# Patient Record
Sex: FEMALE | Race: WHITE | NOT HISPANIC OR LATINO | Employment: OTHER | URBAN - METROPOLITAN AREA
[De-identification: names, ages, dates, MRNs, and addresses within clinical notes are randomized per-mention and may not be internally consistent; named-entity substitution may affect disease eponyms.]

---

## 2017-01-04 ENCOUNTER — HOSPITAL ENCOUNTER (OUTPATIENT)
Dept: PHYSICAL THERAPY | Facility: REHABILITATION | Age: 72
End: 2017-01-04
Attending: NURSE PRACTITIONER
Payer: MEDICARE

## 2017-01-04 PROCEDURE — G8980 MOBILITY D/C STATUS: HCPCS | Mod: CJ

## 2017-01-04 PROCEDURE — 97110 THERAPEUTIC EXERCISES: CPT

## 2017-01-04 PROCEDURE — G8979 MOBILITY GOAL STATUS: HCPCS | Mod: CJ

## 2017-01-04 PROCEDURE — 97014 ELECTRIC STIMULATION THERAPY: CPT

## 2017-01-06 ENCOUNTER — OFFICE VISIT (OUTPATIENT)
Dept: MEDICAL GROUP | Facility: MEDICAL CENTER | Age: 72
End: 2017-01-06
Attending: FAMILY MEDICINE
Payer: MEDICARE

## 2017-01-06 VITALS
TEMPERATURE: 96.9 F | WEIGHT: 164 LBS | BODY MASS INDEX: 30.96 KG/M2 | HEART RATE: 72 BPM | SYSTOLIC BLOOD PRESSURE: 116 MMHG | HEIGHT: 61 IN | OXYGEN SATURATION: 97 % | DIASTOLIC BLOOD PRESSURE: 78 MMHG

## 2017-01-06 DIAGNOSIS — Z96.619 PROSTHETIC SHOULDER INFECTION, SUBSEQUENT ENCOUNTER: ICD-10-CM

## 2017-01-06 DIAGNOSIS — E78.5 DYSLIPIDEMIA: ICD-10-CM

## 2017-01-06 DIAGNOSIS — Z12.31 ENCOUNTER FOR SCREENING MAMMOGRAM FOR MALIGNANT NEOPLASM OF BREAST: ICD-10-CM

## 2017-01-06 DIAGNOSIS — T84.59XD PROSTHETIC SHOULDER INFECTION, SUBSEQUENT ENCOUNTER: ICD-10-CM

## 2017-01-06 DIAGNOSIS — K21.9 GASTROESOPHAGEAL REFLUX DISEASE WITHOUT ESOPHAGITIS: ICD-10-CM

## 2017-01-06 DIAGNOSIS — E03.8 OTHER SPECIFIED HYPOTHYROIDISM: ICD-10-CM

## 2017-01-06 DIAGNOSIS — M85.80 OSTEOPENIA: ICD-10-CM

## 2017-01-06 DIAGNOSIS — F33.41 MAJOR DEPRESSIVE DISORDER, RECURRENT EPISODE, IN PARTIAL REMISSION (HCC): ICD-10-CM

## 2017-01-06 DIAGNOSIS — I48.0 PAROXYSMAL ATRIAL FIBRILLATION (HCC): ICD-10-CM

## 2017-01-06 DIAGNOSIS — I10 ESSENTIAL HYPERTENSION: ICD-10-CM

## 2017-01-06 PROCEDURE — G0438 PPPS, INITIAL VISIT: HCPCS | Performed by: FAMILY MEDICINE

## 2017-01-06 PROCEDURE — G0439 PPPS, SUBSEQ VISIT: HCPCS | Performed by: FAMILY MEDICINE

## 2017-01-06 ASSESSMENT — PATIENT HEALTH QUESTIONNAIRE - PHQ9: CLINICAL INTERPRETATION OF PHQ2 SCORE: 0

## 2017-01-06 NOTE — MR AVS SNAPSHOT
"        Ora Martinez   2017 3:10 PM   Office Visit   MRN: [id number]    Department[facility]   Dept Phone:  [phone]    Description:  Female : 1945   Provider:  Jenaro Treviño M.D.           Reason for Visit     Annual Exam HRA      Allergies as of 2017     Allergen Noted Reactions    Urea 2014       IN CREAMS AND HAIR PRODUCTS    Tape 2015   Rash    Redness        You were diagnosed with     Chronic right shoulder pain   [698486]       Essential hypertension   [7123147]       Gastroesophageal reflux disease without esophagitis   [880947]       Other specified hypothyroidism   [8707648]       Major depressive disorder, recurrent episode, in partial remission ([facility])   [287173]         Vital Signs     Blood Pressure Pulse Temperature Height Weight Body Mass Index    116/78 mmHg 72 36.1 °C (96.9 °F) 1.56 m (5' 1.42\") 74.39 kg (164 lb) 30.57 kg/m2    Oxygen Saturation Breastfeeding? Smoking Status             97% No Former Smoker         Basic Information     Date Of Birth Sex Race Ethnicity Preferred Language    1945 Female White Non- English      Your appointments     2017  1:40 PM   MA SCRN10 with RB MG 3   [facility]    [address]  [place] [id number] [phone]           No deodorant, powder, perfume or lotion under the arm or breast area.            2017  3:10 PM   Established Patient with Jenaro Treviño M.D.   [facility] ([facility]    [address] [id number] [phone]           You will be receiving a confirmation call a few days before your appointment from our automated call confirmation system.              Problem List              ICD-10-CM Priority Class Noted - Resolved    Chronic shoulder pain (Chronic) M25.519, G89.29   2009 - Present    Right hip pain (Chronic) M25.551   2009 - Present    Multinodular goiter E04.2   " 8/13/2009 - Present    HTN (hypertension) I10   Unknown - Present    Anxiety F41.9   6/10/2010 - Present    Insomnia G47.00   6/10/2010 - Present    Dyslipidemia E78.5   6/10/2010 - Present    GERD (gastroesophageal reflux disease) K21.9   6/10/2010 - Present    Hypoxemia requiring supplemental oxygen R09.02, Z99.81   7/22/2010 - Present    Hypothyroid E03.9   4/15/2011 - Present    Helicobacter pylori ab+ treated w/ prevpack in june 2011 R76.8   9/22/2011 - Present    Osteopenia M85.80   4/27/2012 - Present    Marijuana use F12.10   4/24/2013 - Present    Bilateral hand pain M79.641, M79.642   1/29/2015 - Present    Lumbar pain M54.5   3/6/2015 - Present    Chronic allergic rhinitis J30.9   6/2/2015 - Present    Diarrhea R19.7   8/29/2015 - Present    Hypokalemia E87.6   8/29/2015 - Present    Paroxysmal atrial fibrillation (HCC) I48.0   9/11/2015 - Present    Prosthetic shoulder infection (HCC) T84.59XA, Z96.619   9/11/2015 - Present    Macrocytic anemia D53.9   9/11/2015 - Present    Cough R05   10/16/2015 - Present    Abdominal pain, bilateral lower quadrant    4/19/2016 - Present    Acute URI J06.9   4/19/2016 - Present    Abdominal pain, epigastric R10.13   9/22/2016 - Present    Major depressive disorder, recurrent episode, in partial remission (HCC) F33.41   12/9/2016 - Present    Shortness of breath R06.02   12/9/2016 - Present      Health Maintenance        Date Due Completion Dates    IMM DTaP/Tdap/Td Vaccine (1 - Tdap) 11/5/1966 ---    PAP SMEAR 11/5/1968 ---    IMM ZOSTER VACCINE 11/5/2007 ---    MAMMOGRAM 9/30/2015 9/30/2014, 3/8/2013, 10/4/2011, 4/2/2010, 4/2/2010, 9/9/2008, 9/9/2008, 5/25/2007, 5/25/2007, 2/10/2006, 3/19/2004    RETINAL SCREENING 6/2/2016 6/2/2015 (N/S)    Override on 6/2/2015: (N/S) (discussed)    IMM PNEUMOCOCCAL 65+ (ADULT) LOW/MEDIUM RISK SERIES (2 of 2 - PPSV23) 9/1/2016 9/1/2015    A1C SCREENING 6/15/2017 12/15/2016, 8/12/2016, 2/6/2016, 2/26/2015, 7/23/2014, 1/6/2014,  6/22/2011, 11/4/2009, 4/27/2005    DIABETES MONOFILAMTENT / LE EXAM 11/11/2017 11/11/2016    FASTING LIPID PROFILE 12/15/2017 12/15/2016, 2/6/2016, 7/23/2014, 4/18/2013, 4/30/2012, 9/16/2011, 10/4/2010, 11/4/2009, 1/19/2009, 7/16/2008, 5/14/2007, 4/28/2006, 1/12/2006    URINE ACR / MICROALBUMIN 12/15/2017 12/15/2016, 8/12/2016, 9/10/2014    SERUM CREATININE 12/15/2017 12/15/2016, 5/14/2016, 5/13/2016, 5/12/2016, 5/11/2016, 12/14/2015, 11/19/2015, 9/3/2015, 9/2/2015, 8/31/2015, 8/30/2015, 8/29/2015, 7/23/2014, 1/7/2014, 11/12/2013, 11/12/2013, 4/18/2013, 4/8/2013, 4/30/2012, 11/17/2011, 9/16/2011, 6/25/2011, 6/24/2011, 6/23/2011, 6/23/2011, 6/22/2011, 6/22/2011, 6/21/2011, 6/20/2011, 12/17/2010, 10/4/2010, 5/15/2010, 4/14/2010, 11/4/2009, 1/19/2009, 7/16/2008, 5/14/2007, 3/5/2007, 9/26/2006, 4/28/2006, 1/12/2006, 9/14/2005, 4/29/2005, 4/28/2005, 4/27/2005, 4/26/2005    BONE DENSITY 9/30/2019 9/30/2014, 4/26/2012, 7/16/2008, 1/11/2007    COLONOSCOPY 2/6/2023 2/6/2013 (Prv Comp), 2/6/2013 (Prv Comp)    Override on 2/6/2013: Previously completed (6 months ago. [facility]. )    Override on 2/6/2013: Previously completed (correction done 2/2012. due 2/2017)            Current Immunizations     13-VALENT PCV PREVNAR 9/1/2015  5:42 AM    INFLUENZA VACCINE H1N1 12/18/2009    Influenza TIV (IM) 12/10/2013, 10/18/2012, 10/20/2011, 11/10/2010    Influenza Vaccine Pediatric 12/18/2009    Influenza Vaccine Quad Inj (Pf) 11/11/2016, 10/12/2015, 10/16/2014    Pneumococcal Vaccine (UF)Historical Data 11/1/2010      Below and/or attached are the medications your provider expects you to take. Review all of your home medications and newly ordered medications with your provider and/or pharmacist. Follow medication instructions as directed by your provider and/or pharmacist. Please keep your medication list with you and share with your provider. Update the information when medications are discontinued, doses are changed, or new  medications (including over-the-counter products) are added; and carry medication information at all times in the event of emergency situations     Allergies:  UREA - (reactions not documented)     TAPE - Rash               Medications  Valid as of: January 06, 2017 -  3:55 PM    Generic Name Brand Name Tablet Size Instructions for use    Dicyclomine HCl (Cap) BENTYL 10 MG TAKE 1 CAP BY MOUTH 2 TIMES A DAY AS NEEDED.        DiphenhydrAMINE HCl (Tab) BENADRYL 25 MG Take 25 mg by mouth at bedtime as needed for Sleep.        DULoxetine HCl (Cap DR Particles) CYMBALTA 60 MG Take 120 mg by mouth every day.        Eszopiclone (Tab) Eszopiclone 3 MG Take 3 mg by mouth at bedtime as needed.        Folic Acid (Tab) FOLVITE 1 MG Take 1 Tab by mouth every day.        Gabapentin (Cap) NEURONTIN 300 MG TAKE 1 CAPSULE BY MOUTH TWICE DAILY        Ipratropium Bromide (Solution) ATROVENT 0.06 % Spray 2 Sprays in nose 2 times a day as needed.        Levothyroxine Sodium (Tab) SYNTHROID 88 MCG Take 88 mcg by mouth Every morning on an empty stomach.        Levothyroxine Sodium (Tab) SYNTHROID 88 MCG TAKE 1 TABLET BY MOUTH EVERY DAY        Levothyroxine Sodium (Tab) SYNTHROID 75 MCG Take 1 Tab by mouth Every morning on an empty stomach.        Linaclotide (Cap) Linaclotide 145 MCG Take  by mouth.        Loperamide HCl (Cap) IMODIUM 2 MG TAKE 1 CAP BY MOUTH 4 TIMES A DAY AS NEEDED. DIARRHEA        LORazepam (Tab) ATIVAN 1 MG Take 1 mg by mouth 1 time daily as needed.        Metoprolol Tartrate (Tab) LOPRESSOR 25 MG TAKE 1 TABLET BY MOUTH TWICE A DAY        Mometasone Furo-Formoterol Fum (Aerosol) DULERA 100-5 MCG/ACT INHALE 2 PUFFS BY MOUTH 2 TIMES A DAY.        Ramelteon (Tab) ROZEREM 8 MG Take 8 mg by mouth every evening.        RaNITidine HCl (Tab) ZANTAC 150 MG Take 150 mg by mouth 2 times a day.        Rivaroxaban (Tab) XARELTO 20 MG TAKE 1 TABLET BY MOUTH WITH DINNER        Salmeterol Xinafoate (AEROSOL POWDER, BREATH ACTIVATED)  SEREVENT 50 MCG/DOSE Inhale 1 Puff by mouth 2 times a day.        Simvastatin (Tab) ZOCOR 20 MG Take 20 mg by mouth every evening.        Simvastatin (Tab) ZOCOR 20 MG TAKE 1 TABLET BY MOUTH EVERY EVENING        Tiotropium Bromide Monohydrate (Cap) SPIRIVA 18 MCG Inhale 1 Cap by mouth every day.        .                 Medicines prescribed today were sent to:     [facility] #[id number] - [place], [place] - [address]    [address] [place] [address]    Phone: [phone] Fax: [phone]    Open 24 Hours?: No      Medication refill instructions:       If your prescription bottle indicates you have medication refills left, it is not necessary to call your provider’s office. Please contact your pharmacy and they will refill your medication.    If your prescription bottle indicates you do not have any refills left, you may request refills at any time through one of the following ways: The online [employer] system (except Urgent Care), by calling your provider’s office, or by asking your pharmacy to contact your provider’s office with a refill request. Medication refills are processed only during regular business hours and may not be available until the next business day. Your provider may request additional information or to have a follow-up visit with you prior to refilling your medication.   *Please Note: Medication refills are assigned a new Rx number when refilled electronically. Your pharmacy may indicate that no refills were authorized even though a new prescription for the same medication is available at the pharmacy. Please request the medicine by name with the pharmacy before contacting your provider for a refill.        Other Notes About Your Plan     Clarify clonidine at FU appt- ?HTN vs hot flashes  UDS +marijuana. Pt refusing pain management/cessation of marijuana, wean off morphine  On metoclopramide--patient aware of risk of tardive dyskinesia   Screening mammogram due 3/2014           [employer]  Status: Patient Declined

## 2017-01-06 NOTE — PROGRESS NOTES
Depression Screening    Little interest or pleasure in doing things?  0 - not at all  Feeling down, depressed , or hopeless? 0 - not at all  Patient Health Questionnaire Score: 0     If depressive symptoms identified deferred to follow up visit unless specifically addressed in assesment and plan.    Screening for Cognitive Impairment    Three Minute Recall (banana, sunrise, fence)  2/3    Draw clock face with all 12 numbers set to the hand to show 10 minures past 11 o'clock  1    Cognitive concerns identified defferred for follow up unless specifically addressed in assesment and plan.    Fall Risk Assessment    Has the patient had two or more falls in the last year or any fall with injury in the last year?  No    Safety Assessment    Throw rugs on floor.  No  Handrails on all stairs.  Yes  Good lighting in all hallways.  Yes  Difficulty hearing.  No  Patient counseled about all safety risks that were identified.    Functional Assessment ADLs    Are there any barriers preventing you from cooking for yourself or meeting nutritional needs?  No.    Are there any barriers preventing you from driving safely or obtaining transportation?  No.    Are there any barriers preventing you from using a telephone or calling for help?  No.    Are there any barriers preventing you from shopping?  No.    Are there any barriers preventing you from taking care of your own finances?  No.    Are there any barriers preventing you from managing your medications?  No.    Are currently engaing any exercise or physical activity?  Yes.       Health Maintenance Summary                Annual Wellness Visit Overdue 1945     IMM DTaP/Tdap/Td Vaccine Overdue 11/5/1966     PAP SMEAR Overdue 11/5/1968     IMM ZOSTER VACCINE Overdue 11/5/2007     MAMMOGRAM Overdue 9/30/2015      Done 9/30/2014 MA-SCREENING MAMMOGRAM W/ CAD     Patient has more history with this topic...    RETINAL SCREENING Overdue 6/2/2016      Not specified 6/2/2015 discussed     IMM PNEUMOCOCCAL 65+ (ADULT) LOW/MEDIUM RISK SERIES Overdue 9/1/2016      Done 9/1/2015 Imm Admin: Pneumococcal Conjugate Vaccine (Prevnar/PCV-13)    A1C SCREENING Next Due 6/15/2017      Done 12/15/2016 HEMOGLOBIN A1C (A)     Patient has more history with this topic...    DIABETES MONOFILAMTENT / LE EXAM Next Due 11/11/2017      Done 11/11/2016 AMB DIABETIC MONOFILAMENT LOWER EXTREMITY EXAM    FASTING LIPID PROFILE Next Due 12/15/2017      Done 12/15/2016 LIPID PROFILE     Patient has more history with this topic...    URINE ACR / MICROALBUMIN Next Due 12/15/2017      Done 12/15/2016 MICROALBUMIN CREAT RATIO URINE     Patient has more history with this topic...    SERUM CREATININE Next Due 12/15/2017      Done 12/15/2016 COMP METABOLIC PANEL (A)     Patient has more history with this topic...    BONE DENSITY Next Due 9/30/2019      Done 9/30/2014 DS-BONE DENSITY STUDY (DEXA)     Patient has more history with this topic...    COLONOSCOPY Next Due 2/6/2023      Previously completed 2/6/2013 6 months ago. [facility].      Patient has more history with this topic...          Patient Care Team:  Jenaro Treviño M.D. as PCP - General (Family Medicine)

## 2017-01-06 NOTE — PROGRESS NOTES
Chief Complaint   Patient presents with   • Annual Exam     HRA         HPI:  Ora is a 71 y.o. 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/11/2015   • Macrocytic anemia 09/11/2015   • Diarrhea 08/29/2015   • Hypokalemia 08/29/2015   • Chronic allergic rhinitis 06/02/2015   • Lumbar pain 03/06/2015   • Type 2 diabetes mellitus (Formerly [facility]) 02/19/2015   • Bilateral hand pain 01/29/2015   • Marijuana use 04/24/2013   • Osteopenia 04/27/2012   • Helicobacter pylori ab+ treated w/ prevpack in june 2011 09/22/2011   • Hypothyroid 04/15/2011   • Hypoxemia requiring supplemental oxygen 07/22/2010   • Anxiety 06/10/2010   • Insomnia 06/10/2010   • Dyslipidemia 06/10/2010   • GERD (gastroesophageal reflux disease) 06/10/2010   • HTN (hypertension)    • Chronic shoulder pain 08/13/2009   • Right hip pain 08/13/2009   • Multinodular goiter 08/13/2009       Current Outpatient Prescriptions   Medication Sig Dispense Refill   • DULERA 100-5 MCG/ACT Aerosol INHALE 2 PUFFS BY MOUTH 2 TIMES A DAY. 13 Inhaler 3   • metoprolol (LOPRESSOR) 25 MG Tab TAKE 1 TABLET BY MOUTH TWICE A DAY 60 Tab 6   • levothyroxine (SYNTHROID) 75 MCG Tab Take 1 Tab by mouth Every morning on an empty stomach. 30 Tab 11   • salmeterol (SEREVENT) 50 MCG/DOSE AEROSOL POWDER, BREATH ACTIVATED Inhale 1 Puff by mouth 2 times a day. 1 Each 11   • simvastatin (ZOCOR) 20 MG Tab TAKE 1 TABLET BY MOUTH EVERY EVENING 30 Tab 6   • XARELTO 20 MG Tab tablet TAKE 1 TABLET BY MOUTH WITH DINNER 30 Tab 4   • loperamide (IMODIUM) 2 MG Cap TAKE 1 CAP BY MOUTH 4 TIMES A DAY AS  NEEDED. DIARRHEA 60 Cap 1   • dicyclomine (BENTYL) 10 MG Cap TAKE 1 CAP BY MOUTH 2 TIMES A DAY AS NEEDED. 60 Cap 5   • tiotropium (SPIRIVA) 18 MCG Cap Inhale 1 Cap by mouth every day. 30 Cap 3   • levothyroxine (SYNTHROID) 88 MCG Tab TAKE 1 TABLET BY MOUTH EVERY DAY 30 Tab 6   • ipratropium (ATROVENT) 0.06 % Solution Spray 2 Sprays in nose 2 times a day as needed. 1 Bottle 6   • gabapentin (NEURONTIN) 300 MG Cap TAKE 1 CAPSULE BY MOUTH TWICE DAILY 60 Cap 6   • Linaclotide 145 MCG Cap Take  by mouth.     • Eszopiclone 3 MG Tab Take 3 mg by mouth at bedtime as needed.     • ranitidine (ZANTAC) 150 MG Tab Take 150 mg by mouth 2 times a day.     • simvastatin (ZOCOR) 20 MG Tab Take 20 mg by mouth every evening.     • levothyroxine (SYNTHROID) 88 MCG Tab Take 88 mcg by mouth Every morning on an empty stomach.     • diphenhydrAMINE (BENADRYL) 25 MG Tab Take 25 mg by mouth at bedtime as needed for Sleep.     • duloxetine (CYMBALTA) 60 MG Cap DR Particles delayed-release capsule Take 120 mg by mouth every day.     • folic acid (FOLVITE) 1 MG Tab Take 1 Tab by mouth every day. 30 Tab 3   • ramelteon (ROZEREM) 8 MG tablet Take 8 mg by mouth every evening.     • lorazepam (ATIVAN) 1 MG TABS Take 1 mg by mouth 1 time daily as needed.       No current facility-administered medications for this visit.            Current supplements as per medication list.       Allergies: Urea and Tape    Current social contact/activities: ***     She  reports that she quit smoking about 9 years ago. Her smoking use included Cigarettes. She has a 25 pack-year smoking history. She has never used smokeless tobacco. She reports that she drinks alcohol. She reports that she uses illicit drugs.  Counseling given: Not Answered        DPA/Advanced directive: Patient {DOES/DOES NOT:94024} have an advanced directive. If not on file, instructed to bring in a copy to scan into their chart. If no advanced directive exists, a packet and workshop information  "was given on advanced directives. ***    ROS:    Gait: Uses {DEVICE:37313}   Ostomy: {yes no:638778}   Other tubes: {yes no:020904}   Amputations: {yes no:713136}   Chronic oxygen use {yes no:292652}   Last eye exam ***   : {DENIES DEFAULT:20195::\"Denies\"} incontinence.       Screening:  ***  Depression Screening    Little interest or pleasure in doing things?  0 - not at all  Feeling down, depressed , or hopeless? 0 - not at all  Trouble falling or staying asleep, or sleeping too much?     Feeling tired or having little energy?     Poor appetite or overeating?     Feeling bad about yourself - or that you are a failure or have let yourself or your family down?    Trouble concentrating on things, such as reading the newspaper or watching television?    Moving or speaking so slowly that other people could have noticed.  Or the opposite - being so fidgety or restless that you have been moving around a lot more than usual?     Thoughts that you would be better off dead, or of hurting yourself?     Patient Health Questionnaire Score:      If depressive symptoms identified deferred to follow up visit unless specifically addressed in assesment and plan.      Screening for Cognitive Impairment    Three Minute Recall (banana, sunrise, fence)  2/3    Draw clock face with all 12 numbers set to the hand to show 10 minures past 11 o'clock  1    Cognitive concerns identified defferred for follow up unless specifically addressed in assesment and plan.    Fall Risk Assessment    Has the patient had two or more falls in the last year or any fall with injury in the last year?  No    Safety Assessment    Throw rugs on floor.  No  Handrails on all stairs.  Yes  Good lighting in all hallways.  Yes  Difficulty hearing.  No  Patient counseled about all safety risks that were identified.    Functional Assessment ADLs    Are there any barriers preventing you from cooking for yourself or meeting nutritional needs?  No.    Are there any " barriers preventing you from driving safely or obtaining transportation?  No.    Are there any barriers preventing you from using a telephone or calling for help?  No.    Are there any barriers preventing you from shopping?  No.    Are there any barriers preventing you from taking care of your own finances?  No.    Are there any barriers preventing you from managing your medications?  No.    Are currently engaing any exercise or physical activity?  Yes.       Health Maintenance Summary                Annual Wellness Visit Overdue 1945     IMM DTaP/Tdap/Td Vaccine Overdue 11/5/1966     PAP SMEAR Overdue 11/5/1968     IMM ZOSTER VACCINE Overdue 11/5/2007     MAMMOGRAM Overdue 9/30/2015      Done 9/30/2014 MA-SCREENING MAMMOGRAM W/ CAD     Patient has more history with this topic...    RETINAL SCREENING Overdue 6/2/2016      Not specified 6/2/2015 discussed    IMM PNEUMOCOCCAL 65+ (ADULT) LOW/MEDIUM RISK SERIES Overdue 9/1/2016      Done 9/1/2015 Imm Admin: Pneumococcal Conjugate Vaccine (Prevnar/PCV-13)    A1C SCREENING Next Due 6/15/2017      Done 12/15/2016 HEMOGLOBIN A1C (A)     Patient has more history with this topic...    DIABETES MONOFILAMTENT / LE EXAM Next Due 11/11/2017      Done 11/11/2016 AMB DIABETIC MONOFILAMENT LOWER EXTREMITY EXAM    FASTING LIPID PROFILE Next Due 12/15/2017      Done 12/15/2016 LIPID PROFILE     Patient has more history with this topic...    URINE ACR / MICROALBUMIN Next Due 12/15/2017      Done 12/15/2016 MICROALBUMIN CREAT RATIO URINE     Patient has more history with this topic...    SERUM CREATININE Next Due 12/15/2017      Done 12/15/2016 COMP METABOLIC PANEL (A)     Patient has more history with this topic...    BONE DENSITY Next Due 9/30/2019      Done 9/30/2014 DS-BONE DENSITY STUDY (DEXA)     Patient has more history with this topic...    COLONOSCOPY Next Due 2/6/2023      Previously completed 2/6/2013 6 months ago. [facility].      Patient has more history with this  topic...          Patient Care Team:  Jenaro Treviño M.D. as PCP - General (Family Medicine)      Social History   Substance Use Topics   • Smoking status: Former Smoker -- 1.00 packs/day for 25 years     Types: Cigarettes     Quit date: 01/01/2008   • Smokeless tobacco: Never Used   • Alcohol Use: 0.0 oz/week     0 Standard drinks or equivalent per week      Comment: 1 or 2 a mo     Family History   Problem Relation Age of Onset   • Heart Disease Mother    • Cancer Father    • Cancer Maternal Aunt    • Arthritis Maternal Grandmother    • Hypertension Maternal Grandmother    • Diabetes Maternal Grandfather    • Heart Disease Maternal Grandfather    • Cancer Paternal Grandfather      She  has a past medical history of HTN (hypertension); Chronic shoulder pain; Chronic LBP; Dyslipidemia; Fibroadenoma nos; Vitamin D deficiency; Hip pain; Constipation (8/13/2009); Right hip pain (8/13/2009); Genital warts; Hiatal hernia; GERD (gastroesophageal reflux disease); Multinodular goiter; HTN (hypertension); Other and unspecified hyperlipidemia; Pulmonary hypertension (HCC); Psychiatric problem; Indigestion; Fall; Arthritis; Other specified symptom associated with female genital organs; Snoring; CATARACT; Dental disorder; Pneumonia; Cold; Heart burn; Unspecified disorder of thyroid; Pain; Urinary bladder disorder; Scoliosis; ASTHMA; EMPHYSEMA; Bronchitis; Other specified disorder of intestines; and Breath shortness. She also has no past medical history of Breast cancer (HCC).   Past Surgical History   Procedure Laterality Date   • Tonsillectomy and adenoidectomy  1973   • Open reduction  1994     Right humerus   • Abdominal hysterectomy total  1983     Endometriosis   • Gastroscopy-endo  6/22/2011     Performed by DENCIE KENDRICK at ENDOSCOPY [facility] ORS   • Thyroidectomy total  12/14/2011     Performed by [place] ONOFRE at SURGERY SAME DAY [facility] ORS   • Other abdominal surgery  2007     andrei   • Vulvectomy  "partial  1/30/2014     Performed by Celso Harley M.D. at SURGERY [facility]   • Wide excision  1/30/2014     Performed by Celso Harley M.D. at SURGERY [facility]       Exam:     Blood pressure 116/78, pulse 72, temperature 36.1 °C (96.9 °F), height 1.56 m (5' 1.42\"), weight 74.39 kg (164 lb), SpO2 97 %, not currently breastfeeding. Body mass index is 30.57 kg/(m^2).    Hearing {GOOD/FAIR/POOR/EXCELLENT:98164}.    Dentition {DENTITION:79478}  Alert, oriented in no acute distress.  Eye contact is good, speech goal directed, affect calm      Assessment and Plan. The following treatment and monitoring plan is recommended:  ***  No diagnosis found.      Services needed: as per orders if indicated.  Health Care Screening: Age-appropriate preventive services Medicare covers discussed today and ordered if indicated.    Referrals offered: Community-based lifestyle interventions to reduce health risks and promote self-management and wellness, fall prevention, nutrition, physical activity, tobacco-use cessation, weight loss, and mental health services as per orders if indicated.    Discussion today about general wellness and lifestyle habits:    · Prevent falls and reduce trip hazards; Cautioned about securing or removing rugs.  · Have a working fire alarm and carbon monoxide detector;   · Engage in regular physical activity and social activities       Follow-up: No Follow-up on file.    "

## 2017-01-07 ENCOUNTER — HOSPITAL ENCOUNTER (OUTPATIENT)
Dept: RADIOLOGY | Facility: MEDICAL CENTER | Age: 72
End: 2017-01-07
Attending: FAMILY MEDICINE
Payer: MEDICARE

## 2017-01-07 ENCOUNTER — OFFICE VISIT (OUTPATIENT)
Dept: URGENT CARE | Facility: CLINIC | Age: 72
End: 2017-01-07
Payer: MEDICARE

## 2017-01-07 VITALS
TEMPERATURE: 97.8 F | HEIGHT: 61 IN | BODY MASS INDEX: 32.47 KG/M2 | SYSTOLIC BLOOD PRESSURE: 120 MMHG | RESPIRATION RATE: 16 BRPM | OXYGEN SATURATION: 99 % | DIASTOLIC BLOOD PRESSURE: 68 MMHG | HEART RATE: 58 BPM | WEIGHT: 172 LBS

## 2017-01-07 DIAGNOSIS — M25.511 CHRONIC RIGHT SHOULDER PAIN: ICD-10-CM

## 2017-01-07 DIAGNOSIS — G89.29 CHRONIC RIGHT SHOULDER PAIN: ICD-10-CM

## 2017-01-07 DIAGNOSIS — L03.113 CELLULITIS OF RIGHT UPPER EXTREMITY: ICD-10-CM

## 2017-01-07 PROCEDURE — 73030 X-RAY EXAM OF SHOULDER: CPT | Mod: RT

## 2017-01-07 PROCEDURE — 99214 OFFICE O/P EST MOD 30 MIN: CPT | Performed by: FAMILY MEDICINE

## 2017-01-07 NOTE — PROGRESS NOTES
"Subjective:      Ora Martinez is a 71 y.o. female who presents with Annual Exam            Annual Exam        ROS       Objective:     /78 mmHg  Pulse 72  Temp(Src) 36.1 °C (96.9 °F)  Ht 1.56 m (5' 1.42\")  Wt 74.39 kg (164 lb)  BMI 30.57 kg/m2  SpO2 97%  Breastfeeding? No     Physical Exam            Assessment/Plan:     1. Essential hypertension  ***    2. Gastroesophageal reflux disease without esophagitis  ***    3. Other specified hypothyroidism  ***    4. Major depressive disorder, recurrent episode, in partial remission (HCC)  ***    5. Encounter for screening mammogram for malignant neoplasm of breast  ***  - MA-SCREEN MAMMO W/CAD-BILAT    6. Dyslipidemia  ***    7. Osteopenia  ***    8. Paroxysmal atrial fibrillation (HCC)  ***    9. Prosthetic shoulder infection, subsequent encounter  ***        "

## 2017-01-07 NOTE — PROGRESS NOTES
Subjective:      Chief Complaint   Patient presents with   • Pain     R shoulder pain, swelling x1day             Shoulder pain  she has chronic, dull, throbbing, intermittent rt shoulder pain x 20 yrs, but acutely worse since waking up today.   S/p 8 different shoulder surgeries.     She notes acute onset of redness and warmth around the rt shoulder joint.   She denies any recent trauma.   The pain is moderate, but 8/10 at worst. Pertinent negatives include no chest pain , numbness or tingling. Lifting the arm aggravates the symptoms. she has chronic septic arthritis and is on Cefdinir daily.   She tells me that since her shoulder joint replacement, she has had chronic joint infection and chronic pain.       Social History   Substance Use Topics   • Smoking status: Former Smoker -- 1.00 packs/day for 25 years     Types: Cigarettes     Quit date: 01/01/2008   • Smokeless tobacco: Never Used   • Alcohol Use: 0.0 oz/week     0 Standard drinks or equivalent per week      Comment: 1 or 2 a mo         Current Outpatient Prescriptions on File Prior to Visit   Medication Sig Dispense Refill   • DULERA 100-5 MCG/ACT Aerosol INHALE 2 PUFFS BY MOUTH 2 TIMES A DAY. 13 Inhaler 3   • metoprolol (LOPRESSOR) 25 MG Tab TAKE 1 TABLET BY MOUTH TWICE A DAY 60 Tab 6   • levothyroxine (SYNTHROID) 75 MCG Tab Take 1 Tab by mouth Every morning on an empty stomach. 30 Tab 11   • salmeterol (SEREVENT) 50 MCG/DOSE AEROSOL POWDER, BREATH ACTIVATED Inhale 1 Puff by mouth 2 times a day. 1 Each 11   • simvastatin (ZOCOR) 20 MG Tab TAKE 1 TABLET BY MOUTH EVERY EVENING 30 Tab 6   • XARELTO 20 MG Tab tablet TAKE 1 TABLET BY MOUTH WITH DINNER 30 Tab 4   • loperamide (IMODIUM) 2 MG Cap TAKE 1 CAP BY MOUTH 4 TIMES A DAY AS NEEDED. DIARRHEA 60 Cap 1   • dicyclomine (BENTYL) 10 MG Cap TAKE 1 CAP BY MOUTH 2 TIMES A DAY AS NEEDED. 60 Cap 5   • tiotropium (SPIRIVA) 18 MCG Cap Inhale 1 Cap by mouth every day. 30 Cap 3   • levothyroxine (SYNTHROID) 88 MCG Tab  TAKE 1 TABLET BY MOUTH EVERY DAY 30 Tab 6   • ipratropium (ATROVENT) 0.06 % Solution Spray 2 Sprays in nose 2 times a day as needed. 1 Bottle 6   • gabapentin (NEURONTIN) 300 MG Cap TAKE 1 CAPSULE BY MOUTH TWICE DAILY 60 Cap 6   • Linaclotide 145 MCG Cap Take  by mouth.     • Eszopiclone 3 MG Tab Take 3 mg by mouth at bedtime as needed.     • ranitidine (ZANTAC) 150 MG Tab Take 150 mg by mouth 2 times a day.     • simvastatin (ZOCOR) 20 MG Tab Take 20 mg by mouth every evening.     • levothyroxine (SYNTHROID) 88 MCG Tab Take 88 mcg by mouth Every morning on an empty stomach.     • diphenhydrAMINE (BENADRYL) 25 MG Tab Take 25 mg by mouth at bedtime as needed for Sleep.     • duloxetine (CYMBALTA) 60 MG Cap DR Particles delayed-release capsule Take 120 mg by mouth every day.     • folic acid (FOLVITE) 1 MG Tab Take 1 Tab by mouth every day. 30 Tab 3   • ramelteon (ROZEREM) 8 MG tablet Take 8 mg by mouth every evening.     • lorazepam (ATIVAN) 1 MG TABS Take 1 mg by mouth 1 time daily as needed.       No current facility-administered medications on file prior to visit.         Past Medical History   Diagnosis Date   • HTN (hypertension)    • Chronic shoulder pain    • Chronic LBP    • Dyslipidemia    • Fibroadenoma nos    • Vitamin D deficiency    • Hip pain    • Constipation 8/13/2009   • Right hip pain 8/13/2009   • Genital warts    • Hiatal hernia    • GERD (gastroesophageal reflux disease)    • Multinodular goiter    • HTN (hypertension)    • Other and unspecified hyperlipidemia    • Pulmonary hypertension (HCC)    • Psychiatric problem      depression   • Indigestion    • Fall      10 yrs ago, ice   • Arthritis    • Other specified symptom associated with female genital organs    • Snoring    • CATARACT    • Dental disorder      upper/lower dentures   • Pneumonia      2 yrs ago   • Cold      2 months ago   • Heart burn    • Unspecified disorder of thyroid    • Pain    • Urinary bladder disorder      IBS   •  "Scoliosis    • ASTHMA    • EMPHYSEMA    • Bronchitis      DEC 17, 2013, RESOLVED PERSISTENT COUGH   • Other specified disorder of intestines      IBS   • Breath shortness      WITH EXERTION               Review of Systems   Respiratory: Negative for shortness of breath.    Cardiovascular: Negative for chest pain.   Neurological: Negative for tingling, numbness and headaches.   All other systems reviewed and are negative.         Objective:     Blood pressure 120/68, pulse 58, temperature 36.6 °C (97.8 °F), resp. rate 16, height 1.549 m (5' 1\"), weight 78.019 kg (172 lb), SpO2 99 %.      Physical Exam   Constitutional: He is oriented to person, place, and time. Pt appears well-developed. No distress.   HENT:   Head: Normocephalic and atraumatic.   Eyes: Conjunctivae are normal.   Cardiovascular: Normal rate.    Pulmonary/Chest: Effort normal.   Musculoskeletal:        Rt shoulder: pt exhibits decreased range of motion, tenderness (anterior).   There is diffuse warmth and redness around the deltoid muscle.  Previous surgical scars noted. There is no effusion and no crepitus.  no muscle spasm.     Deltoid ,  strength is 5/5.  No cervical spine tenderness.   Neurological: pt is alert and oriented to person, place, and time.  extremities - neurovascularly intact.  Skin: Skin is warm. Pt is not diaphoretic. No erythema.   Psychiatric: pt behavior is normal.   Nursing note and vitals reviewed.            1/7/2017 4:39 PM    HISTORY/REASON FOR EXAM:  pain.  RIGHT shoulder pain and swelling    TECHNIQUE/EXAM DESCRIPTION AND NUMBER OF VIEWS:  3 views of the RIGHT shoulder.    COMPARISON: 1/6/2014    FINDINGS:  RIGHT shoulder prosthesis present, apparently revised since prior exam.  Femoral head component projects anterior to glenoid component consistent with dislocation.  No associated fracture.  RIGHT clavicle is intact.         Impression        1.  RIGHT shoulder prosthesis dislocation.  2.  No associated fracture.     "     Reading Provider Reading Date     Ernesto Rowley M.D. Jan 7, 2017            Assessment/Plan:         1. Cellulitis of right upper extremity        X-rays were personally reviewed by myself.   There is no fracture, but it shows rt prosthesis dislocation.          - sulfamethoxazole-trimethoprim (BACTRIM DS) 800-160 MG tablet; Take 1 Tab by mouth 2 times a day for 7 days.  Dispense: 14 Tab; Refill: 0      -chronic septic arthritis - f/u with infectious disease - Dr. Manzano.   It seems that she possibly also has a superficial cellulitis now, as well.   Will start on bactrim       Prosthesis dislocation - pt left before sling could be applied.   Will refer to ortho for urgent evaluation and treatment.

## 2017-01-07 NOTE — MR AVS SNAPSHOT
"        Ora Martinez   2017 3:30 PM   Office Visit   MRN: [id number]    Department:  [facility]   Dept Phone:  [phone]    Description:  Female : 1945   Provider:  Chuck Ratliff M.D.           Reason for Visit     Pain R shoulder pain, swelling x1day      Allergies as of 2017     Allergen Noted Reactions    Urea 2014       IN CREAMS AND HAIR PRODUCTS    Tape 2015   Rash    Redness        You were diagnosed with     Cellulitis of right upper extremity   [662996]       Chronic right shoulder pain   [[id number]]         Vital Signs     Blood Pressure Pulse Temperature Respirations Height Weight    120/68 mmHg 58 36.6 °C (97.8 °F) 16 1.549 m (5' 1\") 78.019 kg (172 lb)    Body Mass Index Oxygen Saturation Smoking Status             32.52 kg/m2 99% Former Smoker         Basic Information     Date Of Birth Sex Race Ethnicity Preferred Language    1945 Female White Non- English      Your appointments     2017  1:40 PM   MA [id number] with [address] ([address])    [address]  [address]  [facility] [id number] [phone]           No deodorant, powder, perfume or lotion under the arm or breast area.            2017  3:10 PM   Established Patient with Jenaro Treviño M.D.   [facility]    [address] [facility][1] [id number] [phone]           You will be receiving a confirmation call a few days before your appointment from our automated call confirmation system.              Problem List              ICD-10-CM Priority Class Noted - Resolved    Chronic shoulder pain (Chronic) M25.519, G89.29   2009 - Present    Right hip pain (Chronic) M25.551   2009 - Present    Multinodular goiter E04.2   2009 - Present    HTN (hypertension) I10   Unknown - Present    Anxiety F41.9   6/10/2010 - Present    Insomnia G47.00   6/10/2010 - Present    Dyslipidemia E78.5   6/10/2010 " - Present    GERD (gastroesophageal reflux disease) K21.9   6/10/2010 - Present    Hypoxemia requiring supplemental oxygen R09.02, Z99.81   7/22/2010 - Present    Hypothyroid E03.9   4/15/2011 - Present    Helicobacter pylori ab+ treated w/ prevpack in june 2011 R76.8   9/22/2011 - Present    Osteopenia M85.80   4/27/2012 - Present    Marijuana use F12.10   4/24/2013 - Present    Bilateral hand pain M79.641, M79.642   1/29/2015 - Present    Lumbar pain M54.5   3/6/2015 - Present    Chronic allergic rhinitis J30.9   6/2/2015 - Present    Diarrhea R19.7   8/29/2015 - Present    Hypokalemia E87.6   8/29/2015 - Present    Paroxysmal atrial fibrillation (HCC) I48.0   9/11/2015 - Present    Prosthetic shoulder infection (HCC) T84.59XA, Z96.619   9/11/2015 - Present    Macrocytic anemia D53.9   9/11/2015 - Present    Cough R05   10/16/2015 - Present    Abdominal pain, bilateral lower quadrant    4/19/2016 - Present    Acute URI J06.9   4/19/2016 - Present    Abdominal pain, epigastric R10.13   9/22/2016 - Present    Major depressive disorder, recurrent episode, in partial remission (HCC) F33.41   12/9/2016 - Present    Shortness of breath R06.02   12/9/2016 - Present      Health Maintenance        Date Due Completion Dates    IMM DTaP/Tdap/Td Vaccine (1 - Tdap) 11/5/1966 ---    PAP SMEAR 11/5/1968 ---    IMM ZOSTER VACCINE 11/5/2007 ---    MAMMOGRAM 9/30/2015 9/30/2014, 3/8/2013, 10/4/2011, 4/2/2010, 4/2/2010, 9/9/2008, 9/9/2008, 5/25/2007, 5/25/2007, 2/10/2006, 3/19/2004    IMM PNEUMOCOCCAL 65+ (ADULT) LOW/MEDIUM RISK SERIES (2 of 2 - PPSV23) 9/1/2016 9/1/2015    BONE DENSITY 9/30/2019 9/30/2014, 4/26/2012, 7/16/2008, 1/11/2007    COLONOSCOPY 2/6/2023 2/6/2013 (Prv Comp), 2/6/2013 (Prv Comp)    Override on 2/6/2013: Previously completed (6 months ago. [facility]. )    Override on 2/6/2013: Previously completed (correction done 2/2012. due 2/2017)            Current Immunizations     13-VALENT PCV PREVNAR 9/1/2015   5:42 AM    INFLUENZA VACCINE H1N1 12/18/2009    Influenza TIV (IM) 12/10/2013, 10/18/2012, 10/20/2011, 11/10/2010    Influenza Vaccine Pediatric 12/18/2009    Influenza Vaccine Quad Inj (Pf) 11/11/2016, 10/12/2015, 10/16/2014    Pneumococcal Vaccine (UF)Historical Data 11/1/2010      Below and/or attached are the medications your provider expects you to take. Review all of your home medications and newly ordered medications with your provider and/or pharmacist. Follow medication instructions as directed by your provider and/or pharmacist. Please keep your medication list with you and share with your provider. Update the information when medications are discontinued, doses are changed, or new medications (including over-the-counter products) are added; and carry medication information at all times in the event of emergency situations     Allergies:  UREA - (reactions not documented)     TAPE - Rash               Medications  Valid as of: January 07, 2017 -  4:29 PM    Generic Name Brand Name Tablet Size Instructions for use    Dicyclomine HCl (Cap) BENTYL 10 MG TAKE 1 CAP BY MOUTH 2 TIMES A DAY AS NEEDED.        DiphenhydrAMINE HCl (Tab) BENADRYL 25 MG Take 25 mg by mouth at bedtime as needed for Sleep.        DULoxetine HCl (Cap DR Particles) CYMBALTA 60 MG Take 120 mg by mouth every day.        Eszopiclone (Tab) Eszopiclone 3 MG Take 3 mg by mouth at bedtime as needed.        Folic Acid (Tab) FOLVITE 1 MG Take 1 Tab by mouth every day.        Gabapentin (Cap) NEURONTIN 300 MG TAKE 1 CAPSULE BY MOUTH TWICE DAILY        Ipratropium Bromide (Solution) ATROVENT 0.06 % Spray 2 Sprays in nose 2 times a day as needed.        Levothyroxine Sodium (Tab) SYNTHROID 88 MCG Take 88 mcg by mouth Every morning on an empty stomach.        Levothyroxine Sodium (Tab) SYNTHROID 88 MCG TAKE 1 TABLET BY MOUTH EVERY DAY        Levothyroxine Sodium (Tab) SYNTHROID 75 MCG Take 1 Tab by mouth Every morning on an empty stomach.         Linaclotide (Cap) Linaclotide 145 MCG Take  by mouth.        Loperamide HCl (Cap) IMODIUM 2 MG TAKE 1 CAP BY MOUTH 4 TIMES A DAY AS NEEDED. DIARRHEA        LORazepam (Tab) ATIVAN 1 MG Take 1 mg by mouth 1 time daily as needed.        Metoprolol Tartrate (Tab) LOPRESSOR 25 MG TAKE 1 TABLET BY MOUTH TWICE A DAY        Mometasone Furo-Formoterol Fum (Aerosol) DULERA 100-5 MCG/ACT INHALE 2 PUFFS BY MOUTH 2 TIMES A DAY.        Ramelteon (Tab) ROZEREM 8 MG Take 8 mg by mouth every evening.        RaNITidine HCl (Tab) ZANTAC 150 MG Take 150 mg by mouth 2 times a day.        Rivaroxaban (Tab) XARELTO 20 MG TAKE 1 TABLET BY MOUTH WITH DINNER        Salmeterol Xinafoate (AEROSOL POWDER, BREATH ACTIVATED) SEREVENT 50 MCG/DOSE Inhale 1 Puff by mouth 2 times a day.        Simvastatin (Tab) ZOCOR 20 MG Take 20 mg by mouth every evening.        Simvastatin (Tab) ZOCOR 20 MG TAKE 1 TABLET BY MOUTH EVERY EVENING        Tiotropium Bromide Monohydrate (Cap) SPIRIVA 18 MCG Inhale 1 Cap by mouth every day.        .                 Medicines prescribed today were sent to:     [facility] #[id number] - [place], [place] - [address] [address]    [address] [address]    Phone: [phone] Fax: [phone]    Open 24 Hours?: No      Medication refill instructions:       If your prescription bottle indicates you have medication refills left, it is not necessary to call your provider’s office. Please contact your pharmacy and they will refill your medication.    If your prescription bottle indicates you do not have any refills left, you may request refills at any time through one of the following ways: The online [employer] system (except Urgent Care), by calling your provider’s office, or by asking your pharmacy to contact your provider’s office with a refill request. Medication refills are processed only during regular business hours and may not be available until the next business day. Your provider may request additional information or  to have a follow-up visit with you prior to refilling your medication.   *Please Note: Medication refills are assigned a new Rx number when refilled electronically. Your pharmacy may indicate that no refills were authorized even though a new prescription for the same medication is available at the pharmacy. Please request the medicine by name with the pharmacy before contacting your provider for a refill.        Your To Do List     Future Labs/Procedures Complete By Expires    DX-SHOULDER 2+ RIGHT  As directed 7/10/2017      Other Notes About Your Plan     Clarify clonidine at FU appt- ?HTN vs hot flashes  UDS +marijuana. Pt refusing pain management/cessation of marijuana, wean off morphine  On metoclopramide--patient aware of risk of tardive dyskinesia   Screening mammogram due 3/2014           Marita Status: Patient Declined

## 2017-01-07 NOTE — PROGRESS NOTES
Chief Complaint   Patient presents with   • Annual Exam     HRA         HPI:  Ora is a 71 y.o. 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/11/2015   • Macrocytic anemia 09/11/2015   • Diarrhea 08/29/2015   • Hypokalemia 08/29/2015   • Chronic allergic rhinitis 06/02/2015   • Lumbar pain 03/06/2015   • Bilateral hand pain 01/29/2015   • Marijuana use 04/24/2013   • Osteopenia 04/27/2012   • Helicobacter pylori ab+ treated w/ prevpack in june 2011 09/22/2011   • Hypothyroid 04/15/2011   • Hypoxemia requiring supplemental oxygen 07/22/2010   • Anxiety 06/10/2010   • Insomnia 06/10/2010   • Dyslipidemia 06/10/2010   • GERD (gastroesophageal reflux disease) 06/10/2010   • HTN (hypertension)    • Chronic shoulder pain 08/13/2009   • Right hip pain 08/13/2009   • Multinodular goiter 08/13/2009       Current Outpatient Prescriptions   Medication Sig Dispense Refill   • DULERA 100-5 MCG/ACT Aerosol INHALE 2 PUFFS BY MOUTH 2 TIMES A DAY. 13 Inhaler 3   • metoprolol (LOPRESSOR) 25 MG Tab TAKE 1 TABLET BY MOUTH TWICE A DAY 60 Tab 6   • levothyroxine (SYNTHROID) 75 MCG Tab Take 1 Tab by mouth Every morning on an empty stomach. 30 Tab 11   • salmeterol (SEREVENT) 50 MCG/DOSE AEROSOL POWDER, BREATH ACTIVATED Inhale 1 Puff by mouth 2 times a day. 1 Each 11   • simvastatin (ZOCOR) 20 MG Tab TAKE 1 TABLET BY MOUTH EVERY EVENING 30 Tab 6   • XARELTO 20 MG Tab tablet TAKE 1 TABLET BY MOUTH WITH DINNER 30 Tab 4   • loperamide (IMODIUM) 2 MG Cap TAKE 1 CAP BY MOUTH 4 TIMES A DAY AS NEEDED. DIARRHEA 60 Cap 1   • dicyclomine  (BENTYL) 10 MG Cap TAKE 1 CAP BY MOUTH 2 TIMES A DAY AS NEEDED. 60 Cap 5   • tiotropium (SPIRIVA) 18 MCG Cap Inhale 1 Cap by mouth every day. 30 Cap 3   • levothyroxine (SYNTHROID) 88 MCG Tab TAKE 1 TABLET BY MOUTH EVERY DAY 30 Tab 6   • ipratropium (ATROVENT) 0.06 % Solution Spray 2 Sprays in nose 2 times a day as needed. 1 Bottle 6   • gabapentin (NEURONTIN) 300 MG Cap TAKE 1 CAPSULE BY MOUTH TWICE DAILY 60 Cap 6   • Linaclotide 145 MCG Cap Take  by mouth.     • Eszopiclone 3 MG Tab Take 3 mg by mouth at bedtime as needed.     • ranitidine (ZANTAC) 150 MG Tab Take 150 mg by mouth 2 times a day.     • simvastatin (ZOCOR) 20 MG Tab Take 20 mg by mouth every evening.     • levothyroxine (SYNTHROID) 88 MCG Tab Take 88 mcg by mouth Every morning on an empty stomach.     • diphenhydrAMINE (BENADRYL) 25 MG Tab Take 25 mg by mouth at bedtime as needed for Sleep.     • duloxetine (CYMBALTA) 60 MG Cap DR Particles delayed-release capsule Take 120 mg by mouth every day.     • folic acid (FOLVITE) 1 MG Tab Take 1 Tab by mouth every day. 30 Tab 3   • ramelteon (ROZEREM) 8 MG tablet Take 8 mg by mouth every evening.     • lorazepam (ATIVAN) 1 MG TABS Take 1 mg by mouth 1 time daily as needed.       No current facility-administered medications for this visit.            Current supplements as per medication list.       Allergies: Urea and Tape    Current social contact/activities: lives with son. Not working.     She  reports that she quit smoking about 9 years ago. Her smoking use included Cigarettes. She has a 25 pack-year smoking history. She has never used smokeless tobacco. She reports that she drinks alcohol. She reports that she uses illicit drugs.  Counseling given: Not Answered        DPA/Advanced directive: Patient do not have an advanced directive. If not on file, instructed to bring in a copy to scan into their chart.     ROS:    Gait: Uses no assistive device   Ostomy: no   Other tubes: no   Amputations: no    Chronic oxygen use no   Last eye exam Dec. 2016, will ask for copy    : Denies incontinence.       Screening:    Depression Screening    Little interest or pleasure in doing things?  0 - not at all  Feeling down, depressed , or hopeless? 0 - not at all  Trouble falling or staying asleep, or sleeping too much?     Feeling tired or having little energy?     Poor appetite or overeating?     Feeling bad about yourself - or that you are a failure or have let yourself or your family down?    Trouble concentrating on things, such as reading the newspaper or watching television?    Moving or speaking so slowly that other people could have noticed.  Or the opposite - being so fidgety or restless that you have been moving around a lot more than usual?     Thoughts that you would be better off dead, or of hurting yourself?     Patient Health Questionnaire Score:      If depressive symptoms identified deferred to follow up visit unless specifically addressed in assesment and plan.      Screening for Cognitive Impairment    Three Minute Recall (banana, sunrise, fence)  2/3    Draw clock face with all 12 numbers set to the hand to show 10 minures past 11 o'clock  1    Cognitive concerns identified defferred for follow up unless specifically addressed in assesment and plan.    Fall Risk Assessment    Has the patient had two or more falls in the last year or any fall with injury in the last year?  No    Safety Assessment    Throw rugs on floor.  No  Handrails on all stairs.  Yes  Good lighting in all hallways.  Yes  Difficulty hearing.  No  Patient counseled about all safety risks that were identified.    Functional Assessment ADLs    Are there any barriers preventing you from cooking for yourself or meeting nutritional needs?  No.    Are there any barriers preventing you from driving safely or obtaining transportation?  No.    Are there any barriers preventing you from using a telephone or calling for help?  No.    Are there  any barriers preventing you from shopping?  No.    Are there any barriers preventing you from taking care of your own finances?  No.    Are there any barriers preventing you from managing your medications?  No.    Are currently engaing any exercise or physical activity?  Yes.       Health Maintenance Summary                Annual Wellness Visit Overdue 1945     IMM DTaP/Tdap/Td Vaccine Overdue 11/5/1966     PAP SMEAR Overdue 11/5/1968     IMM ZOSTER VACCINE Overdue 11/5/2007     MAMMOGRAM Overdue 9/30/2015      Done 9/30/2014 MA-SCREENING MAMMOGRAM W/ CAD     Patient has more history with this topic...    RETINAL SCREENING Overdue 6/2/2016      Not specified 6/2/2015 discussed    IMM PNEUMOCOCCAL 65+ (ADULT) LOW/MEDIUM RISK SERIES Overdue 9/1/2016      Done 9/1/2015 Imm Admin: Pneumococcal Conjugate Vaccine (Prevnar/PCV-13)    A1C SCREENING Next Due 6/15/2017      Done 12/15/2016 HEMOGLOBIN A1C (A)     Patient has more history with this topic...    DIABETES MONOFILAMTENT / LE EXAM Next Due 11/11/2017      Done 11/11/2016 AMB DIABETIC MONOFILAMENT LOWER EXTREMITY EXAM    FASTING LIPID PROFILE Next Due 12/15/2017      Done 12/15/2016 LIPID PROFILE     Patient has more history with this topic...    URINE ACR / MICROALBUMIN Next Due 12/15/2017      Done 12/15/2016 MICROALBUMIN CREAT RATIO URINE     Patient has more history with this topic...    SERUM CREATININE Next Due 12/15/2017      Done 12/15/2016 COMP METABOLIC PANEL (A)     Patient has more history with this topic...    BONE DENSITY Next Due 9/30/2019      Done 9/30/2014 DS-BONE DENSITY STUDY (DEXA)     Patient has more history with this topic...    COLONOSCOPY Next Due 2/6/2023      Previously completed 2/6/2013 6 months ago. [facility].      Patient has more history with this topic...          Patient Care Team:  Jenaro Treviño M.D. as PCP - General (Family Medicine)      Social History   Substance Use Topics   • Smoking status: Former Smoker --  1.00 packs/day for 25 years     Types: Cigarettes     Quit date: 01/01/2008   • Smokeless tobacco: Never Used   • Alcohol Use: 0.0 oz/week     0 Standard drinks or equivalent per week      Comment: 1 or 2 a mo     Family History   Problem Relation Age of Onset   • Heart Disease Mother    • Cancer Father    • Cancer Maternal Aunt    • Arthritis Maternal Grandmother    • Hypertension Maternal Grandmother    • Diabetes Maternal Grandfather    • Heart Disease Maternal Grandfather    • Cancer Paternal Grandfather      She  has a past medical history of HTN (hypertension); Chronic shoulder pain; Chronic LBP; Dyslipidemia; Fibroadenoma nos; Vitamin D deficiency; Hip pain; Constipation (8/13/2009); Right hip pain (8/13/2009); Genital warts; Hiatal hernia; GERD (gastroesophageal reflux disease); Multinodular goiter; HTN (hypertension); Other and unspecified hyperlipidemia; Pulmonary hypertension (HCC); Psychiatric problem; Indigestion; Fall; Arthritis; Other specified symptom associated with female genital organs; Snoring; CATARACT; Dental disorder; Pneumonia; Cold; Heart burn; Unspecified disorder of thyroid; Pain; Urinary bladder disorder; Scoliosis; ASTHMA; EMPHYSEMA; Bronchitis; Other specified disorder of intestines; and Breath shortness. She also has no past medical history of Breast cancer (HCC).   Past Surgical History   Procedure Laterality Date   • Tonsillectomy and adenoidectomy  1973   • Open reduction  1994     Right humerus   • Abdominal hysterectomy total  1983     Endometriosis   • Gastroscopy-endo  6/22/2011     Performed by DENICE KENDRICK at ENDOSCOPY [facility] ORS   • Thyroidectomy total  12/14/2011     Performed by [place] ONOFRE at SURGERY SAME DAY [facility] ORS   • Other abdominal surgery  2007     andrei   • Vulvectomy partial  1/30/2014     Performed by Celso Harley M.D. at SURGERY [facility] ORS   • Wide excision  1/30/2014     Performed by Celso Harley M.D. at SURGERY [facility] ORS  "      Exam:     Blood pressure 116/78, pulse 72, temperature 36.1 °C (96.9 °F), height 1.56 m (5' 1.42\"), weight 74.39 kg (164 lb), SpO2 97 %, not currently breastfeeding. Body mass index is 30.57 kg/(m^2).    Hearing good.    Dentition fair  Alert, oriented in no acute distress.  Eye contact is good, speech goal directed, affect calm      Assessment and Plan. The following treatment and monitoring plan is recommended:    1. Essential hypertension   status-controlled, treatment- continue medication    2. Gastroesophageal reflux disease without esophagitis   status-controlled, treatment-continue medication    3. Other specified hypothyroidism   status-controlled, treatment-continue medication    4. Major depressive disorder, recurrent episode, in partial remission (HCC)   status-improved, treatment continue medication    5. Encounter for screening mammogram for malignant neoplasm of breast  MA-SCREEN MAMMO W/CAD-BILAT   6. Dyslipidemia   status-improved, treatment-continue medication    7. Osteopenia   status-improved, treatment OTC calcium vitamin D supplementation    8. Paroxysmal atrial fibrillation (HCC)   status-improved, treatment-continue anticoagulation    9. Prosthetic shoulder infection, subsequent encounter   status-improved, treatment continue follow-up with infectious disease and orthopedic specialists          Services needed: as per orders if indicated.  Health Care Screening: Age-appropriate preventive services Medicare covers discussed today and ordered if indicated.    Referrals offered: Community-based lifestyle interventions to reduce health risks and promote self-management and wellness, fall prevention, nutrition, physical activity, tobacco-use cessation, weight loss, and mental health services as per orders if indicated.    Discussion today about general wellness and lifestyle habits:    · Prevent falls and reduce trip hazards; Cautioned about securing or removing rugs.  · Have a working fire " alarm and carbon monoxide detector;   · Engage in regular physical activity and social activities       Follow-up: 1. Revisit in one month-will receive TDAP at that time. Future Pap smears are not indicated. Periodic monitoring of glucose indicated for elevated fasting glucose levels without satisfying diagnostic criteria for diabetes at this point

## 2017-01-08 ENCOUNTER — TELEPHONE (OUTPATIENT)
Dept: URGENT CARE | Facility: PHYSICIAN GROUP | Age: 72
End: 2017-01-08

## 2017-01-08 RX ORDER — SULFAMETHOXAZOLE AND TRIMETHOPRIM 800; 160 MG/1; MG/1
1 TABLET ORAL 2 TIMES DAILY
Qty: 14 TAB | Refills: 0 | Status: SHIPPED | OUTPATIENT
Start: 2017-01-08 | End: 2017-01-15

## 2017-01-13 ENCOUNTER — APPOINTMENT (OUTPATIENT)
Dept: RADIOLOGY | Facility: MEDICAL CENTER | Age: 72
End: 2017-01-13
Attending: FAMILY MEDICINE
Payer: MEDICARE

## 2017-01-30 RX ORDER — ERGOCALCIFEROL 1.25 MG/1
CAPSULE ORAL
Qty: 4 CAP | Refills: 11 | Status: SHIPPED | OUTPATIENT
Start: 2017-01-30 | End: 2017-11-16 | Stop reason: SDUPTHER

## 2017-01-31 RX ORDER — LOPERAMIDE HYDROCHLORIDE 2 MG/1
CAPSULE ORAL
Qty: 60 CAP | Refills: 1 | Status: SHIPPED | OUTPATIENT
Start: 2017-01-31 | End: 2017-03-06 | Stop reason: SDUPTHER

## 2017-02-06 ENCOUNTER — OFFICE VISIT (OUTPATIENT)
Dept: MEDICAL GROUP | Facility: MEDICAL CENTER | Age: 72
End: 2017-02-06
Attending: FAMILY MEDICINE
Payer: MEDICARE

## 2017-02-06 VITALS
TEMPERATURE: 97.3 F | HEART RATE: 64 BPM | SYSTOLIC BLOOD PRESSURE: 150 MMHG | RESPIRATION RATE: 12 BRPM | DIASTOLIC BLOOD PRESSURE: 62 MMHG | HEIGHT: 61 IN | WEIGHT: 171 LBS | BODY MASS INDEX: 32.28 KG/M2 | OXYGEN SATURATION: 97 %

## 2017-02-06 DIAGNOSIS — S43.005A DISLOCATED SHOULDER, LEFT, INITIAL ENCOUNTER: ICD-10-CM

## 2017-02-06 DIAGNOSIS — G89.29 CHRONIC RIGHT SHOULDER PAIN: Chronic | ICD-10-CM

## 2017-02-06 DIAGNOSIS — I48.0 PAROXYSMAL ATRIAL FIBRILLATION (HCC): ICD-10-CM

## 2017-02-06 DIAGNOSIS — M25.511 CHRONIC RIGHT SHOULDER PAIN: Chronic | ICD-10-CM

## 2017-02-06 DIAGNOSIS — I10 ESSENTIAL HYPERTENSION: ICD-10-CM

## 2017-02-06 DIAGNOSIS — K21.9 GASTROESOPHAGEAL REFLUX DISEASE WITHOUT ESOPHAGITIS: ICD-10-CM

## 2017-02-06 PROCEDURE — G8432 DEP SCR NOT DOC, RNG: HCPCS | Performed by: FAMILY MEDICINE

## 2017-02-06 PROCEDURE — 1036F TOBACCO NON-USER: CPT | Performed by: FAMILY MEDICINE

## 2017-02-06 PROCEDURE — 4040F PNEUMOC VAC/ADMIN/RCVD: CPT | Performed by: FAMILY MEDICINE

## 2017-02-06 PROCEDURE — 99213 OFFICE O/P EST LOW 20 MIN: CPT | Performed by: FAMILY MEDICINE

## 2017-02-06 PROCEDURE — 3014F SCREEN MAMMO DOC REV: CPT | Performed by: FAMILY MEDICINE

## 2017-02-06 PROCEDURE — 1101F PT FALLS ASSESS-DOCD LE1/YR: CPT | Performed by: FAMILY MEDICINE

## 2017-02-06 PROCEDURE — G8482 FLU IMMUNIZE ORDER/ADMIN: HCPCS | Performed by: FAMILY MEDICINE

## 2017-02-06 PROCEDURE — G8419 CALC BMI OUT NRM PARAM NOF/U: HCPCS | Performed by: FAMILY MEDICINE

## 2017-02-06 PROCEDURE — 3017F COLORECTAL CA SCREEN DOC REV: CPT | Performed by: FAMILY MEDICINE

## 2017-02-06 PROCEDURE — 99214 OFFICE O/P EST MOD 30 MIN: CPT | Performed by: FAMILY MEDICINE

## 2017-02-06 ASSESSMENT — PAIN SCALES - GENERAL: PAINLEVEL: 7=MODERATE-SEVERE PAIN

## 2017-02-06 NOTE — MR AVS SNAPSHOT
"        Ora Martinez   2017 3:10 PM   Office Visit   MRN: [id number]    [facility]   Dept Phone:  [phone]    Description:  Female : 1945   Provider:  Jenaro Treviño M.D.           Reason for Visit     Shoulder Pain           Allergies as of 2017     Allergen Noted Reactions    Urea 2014       IN CREAMS AND HAIR PRODUCTS    Tape 2015   Rash    Redness        You were diagnosed with     Dislocated shoulder, left, initial encounter   [642729]       Paroxysmal atrial fibrillation (CMS-HCC)   [565684]       Essential hypertension   [0072843]       Gastroesophageal reflux disease without esophagitis   [192162]       Chronic right shoulder pain   [366898]         Vital Signs     Blood Pressure Pulse Temperature Respirations Height Weight    150/62 mmHg 64 36.3 °C (97.3 °F) 12 1.549 m (5' 1\") 77.565 kg (171 lb)    Body Mass Index Oxygen Saturation Smoking Status             32.33 kg/m2 97% Former Smoker         Basic Information     Date Of Birth Sex Race Ethnicity Preferred Language    1945 Female White Non- English      Your appointments     2017  3:20 PM   MA SCRN10 with RB MG 2   [facility] ([address])    [address]  [facility] [id number] [phone]           No deodorant, powder, perfume or lotion under the arm or breast area.            Mar 06, 2017  3:10 PM   Established Patient with Jenaro Treviño M.D.   [facility]    [address] [id number] [phone]           You will be receiving a confirmation call a few days before your appointment from our automated call confirmation system.              Problem List              ICD-10-CM Priority Class Noted - Resolved    Chronic shoulder pain (Chronic) M25.519, G89.29   2009 - Present    Right hip pain (Chronic) M25.551   2009 - Present    Multinodular goiter E04.2   2009 - Present   "    HTN (hypertension) I10   Unknown - Present    Anxiety F41.9   6/10/2010 - Present    Insomnia G47.00   6/10/2010 - Present    Dyslipidemia E78.5   6/10/2010 - Present    GERD (gastroesophageal reflux disease) K21.9   6/10/2010 - Present    Hypoxemia requiring supplemental oxygen R09.02, Z99.81   7/22/2010 - Present    Hypothyroid E03.9   4/15/2011 - Present    Helicobacter pylori ab+ treated w/ prevpack in june 2011 R76.8   9/22/2011 - Present    Osteopenia M85.80   4/27/2012 - Present    Marijuana use F12.10   4/24/2013 - Present    Bilateral hand pain M79.641, M79.642   1/29/2015 - Present    Lumbar pain M54.5   3/6/2015 - Present    Chronic allergic rhinitis J30.9   6/2/2015 - Present    Diarrhea R19.7   8/29/2015 - Present    Hypokalemia E87.6   8/29/2015 - Present    Paroxysmal atrial fibrillation (CMS-HCC) I48.0   9/11/2015 - Present    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, Z96.619   9/11/2015 - Present    Macrocytic anemia D53.9   9/11/2015 - Present    Cough R05   10/16/2015 - Present    Abdominal pain, bilateral lower quadrant    4/19/2016 - Present    Abdominal pain, epigastric R10.13   9/22/2016 - Present    Major depressive disorder, recurrent episode, in partial remission (CMS-HCC) F33.41   12/9/2016 - Present    Shortness of breath R06.02   12/9/2016 - Present      Health Maintenance        Date Due Completion Dates    IMM DTaP/Tdap/Td Vaccine (1 - Tdap) 11/5/1966 ---    PAP SMEAR 11/5/1968 ---    IMM ZOSTER VACCINE 11/5/2007 ---    MAMMOGRAM 9/30/2015 9/30/2014, 3/8/2013, 10/4/2011, 4/2/2010, 4/2/2010, 9/9/2008, 9/9/2008, 5/25/2007, 5/25/2007, 2/10/2006, 3/19/2004    IMM PNEUMOCOCCAL 65+ (ADULT) LOW/MEDIUM RISK SERIES (2 of 2 - PPSV23) 9/1/2016 9/1/2015    BONE DENSITY 9/30/2019 9/30/2014, 4/26/2012, 7/16/2008, 1/11/2007    COLONOSCOPY 2/6/2023 2/6/2013 (Prv Comp), 2/6/2013 (Prv Comp)    Override on 2/6/2013: Previously completed (6 months ago. [facility]. )    Override on 2/6/2013:  Previously completed (correction done 2/2012. due 2/2017)            Current Immunizations     13-VALENT PCV PREVNAR 9/1/2015  5:42 AM    INFLUENZA VACCINE H1N1 12/18/2009    Influenza TIV (IM) 12/10/2013, 10/18/2012, 10/20/2011, 11/10/2010    Influenza Vaccine Pediatric 12/18/2009    Influenza Vaccine Quad Inj (Pf) 11/11/2016, 10/12/2015, 10/16/2014    Pneumococcal Vaccine (UF)Historical Data 11/1/2010      Below and/or attached are the medications your provider expects you to take. Review all of your home medications and newly ordered medications with your provider and/or pharmacist. Follow medication instructions as directed by your provider and/or pharmacist. Please keep your medication list with you and share with your provider. Update the information when medications are discontinued, doses are changed, or new medications (including over-the-counter products) are added; and carry medication information at all times in the event of emergency situations     Allergies:  UREA - (reactions not documented)     TAPE - Rash               Medications  Valid as of: February 06, 2017 -  4:13 PM    Generic Name Brand Name Tablet Size Instructions for use    Dicyclomine HCl (Cap) BENTYL 10 MG TAKE 1 CAP BY MOUTH 2 TIMES A DAY AS NEEDED.        DiphenhydrAMINE HCl (Tab) BENADRYL 25 MG Take 25 mg by mouth at bedtime as needed for Sleep.        DULoxetine HCl (Cap DR Particles) CYMBALTA 60 MG Take 120 mg by mouth every day.        Ergocalciferol (Cap) DRISDOL 89728 UNITS TAKE 1 CAP BY MOUTH EVERY 7 DAYS.        Eszopiclone (Tab) Eszopiclone 3 MG Take 3 mg by mouth at bedtime as needed.        Folic Acid (Tab) FOLVITE 1 MG Take 1 Tab by mouth every day.        Gabapentin (Cap) NEURONTIN 300 MG TAKE 1 CAPSULE BY MOUTH TWICE DAILY        Ipratropium Bromide (Solution) ATROVENT 0.06 % Spray 2 Sprays in nose 2 times a day as needed.        Levothyroxine Sodium (Tab) SYNTHROID 88 MCG Take 88 mcg by mouth Every morning on an empty  stomach.        Levothyroxine Sodium (Tab) SYNTHROID 88 MCG TAKE 1 TABLET BY MOUTH EVERY DAY        Levothyroxine Sodium (Tab) SYNTHROID 75 MCG Take 1 Tab by mouth Every morning on an empty stomach.        Linaclotide (Cap) Linaclotide 145 MCG Take  by mouth.        Loperamide HCl (Cap) IMODIUM 2 MG TAKE 1 CAP BY MOUTH 4 TIMES A DAY AS NEEDED. DIARRHEA        LORazepam (Tab) ATIVAN 1 MG Take 1 mg by mouth 1 time daily as needed.        Metoprolol Tartrate (Tab) LOPRESSOR 25 MG TAKE 1 TABLET BY MOUTH TWICE A DAY        Mometasone Furo-Formoterol Fum (Aerosol) DULERA 100-5 MCG/ACT INHALE 2 PUFFS BY MOUTH 2 TIMES A DAY.        Ramelteon (Tab) ROZEREM 8 MG Take 8 mg by mouth every evening.        RaNITidine HCl (Tab) ZANTAC 150 MG Take 150 mg by mouth 2 times a day.        Rivaroxaban (Tab) XARELTO 20 MG TAKE 1 TABLET BY MOUTH WITH DINNER        Salmeterol Xinafoate (AEROSOL POWDER, BREATH ACTIVATED) SEREVENT 50 MCG/DOSE Inhale 1 Puff by mouth 2 times a day.        Simvastatin (Tab) ZOCOR 20 MG Take 20 mg by mouth every evening.        Simvastatin (Tab) ZOCOR 20 MG TAKE 1 TABLET BY MOUTH EVERY EVENING        Tiotropium Bromide Monohydrate (Cap) SPIRIVA 18 MCG Inhale 1 Cap by mouth every day.        .                 Medicines prescribed today were sent to:     [id number] - [place], [place] - [address] [address]    [address] [address] [place] [address]    Phone: [phone] Fax: [phone]    Open 24 Hours?: No      Medication refill instructions:       If your prescription bottle indicates you have medication refills left, it is not necessary to call your provider’s office. Please contact your pharmacy and they will refill your medication.    If your prescription bottle indicates you do not have any refills left, you may request refills at any time through one of the following ways: The online [employer] system (except Urgent Care), by calling your provider’s office, or by asking your pharmacy to contact your  provider’s office with a refill request. Medication refills are processed only during regular business hours and may not be available until the next business day. Your provider may request additional information or to have a follow-up visit with you prior to refilling your medication.   *Please Note: Medication refills are assigned a new Rx number when refilled electronically. Your pharmacy may indicate that no refills were authorized even though a new prescription for the same medication is available at the pharmacy. Please request the medicine by name with the pharmacy before contacting your provider for a refill.        Other Notes About Your Plan     Clarify clonidine at FU appt- ?HTN vs hot flashes  UDS +marijuana. Pt refusing pain management/cessation of marijuana, wean off morphine  On metoclopramide--patient aware of risk of tardive dyskinesia   Screening mammogram due 3/2014           Ranit Status: Patient Declined

## 2017-02-07 NOTE — ASSESSMENT & PLAN NOTE
No recent home blood pressure recordings. Patient is compliant taking 25 mg of metoprolol twice daily. Not reporting chest pain or chest pressure.

## 2017-02-07 NOTE — PROGRESS NOTES
Chief Complaint   Patient presents with   • Shoulder Pain       HISTORY OF PRESENT ILLNESS: Patient is a 71 y.o. 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 She did consult with Dr. Roa who obtained a new plain x-ray which shows complete disarticulation of the ball portion of her prosthesis from the cup which is in place on her scapula, right shoulder. Patient also reports over the past 2 weeks she's had increased pain over the anterior and superior aspects of her left shoulder as well  She remains on antibiotic for chronic infection of the right shoulder (cefdinir). She did see Dr. Adhikari in the past 2 weeks along with Dr. Roa, orthopedic surgery. She has had a prior surgeries on her right shoulder. No current unexplained fevers. She is considering asking for limited use of Norco, she sees Dr. Sims for pain management. She also is taking 400 mg of gabapentin twice daily would consider taking it 3 times a day if it does not make her dizzy-that happened after a single accidental double dose of 800 mg on a past occasion. She is also smoking marijuana approximately 3 times a day for alternative pain relief.    HTN (hypertension)  No recent home blood pressure recordings. Patient is compliant taking 25 mg of metoprolol twice daily. Not reporting chest pain or chest pressure.    Paroxysmal atrial fibrillation  No recent palpitations. Patient is compliant taking her xarelto, and low-dose metoprolol. Denies near syncope. Did notice lightheadedness presumably due to accidentally taking a double dose of gabapentin at 800 mg.    GERD (gastroesophageal reflux disease)  Patient reports with increased bilateral  shoulder pain in the past 2 weeks she has been taking four 200 mg of over-the-counter ibuprofen twice daily along with 3 Tylenol twice a day, taken with at least a light snack or meal. She is not reporting any increase in her occasional substernal burning heartburn symptoms. Denies any current epigastric pain. No black or bloody stools. Is continuing to take ranitidine.      Patient Active Problem List    Diagnosis Date Noted   • Major depressive disorder, recurrent episode, in partial remission (CMS-HCC) 12/09/2016   • Shortness of breath 12/09/2016   • Abdominal pain, epigastric 09/22/2016   • Abdominal pain, bilateral lower quadrant 04/19/2016   • Cough 10/16/2015   • Paroxysmal atrial fibrillation (CMS-HCC) 09/11/2015   • Prosthetic shoulder infection (CMS-HCC) 09/11/2015   • Macrocytic anemia 09/11/2015   • Diarrhea 08/29/2015   • Hypokalemia 08/29/2015   • Chronic allergic rhinitis 06/02/2015   • Lumbar pain 03/06/2015   • Bilateral hand pain 01/29/2015   • Marijuana use 04/24/2013   • Osteopenia 04/27/2012   • Helicobacter pylori ab+ treated w/ prevpack in june 2011 09/22/2011   • Hypothyroid 04/15/2011   • Hypoxemia requiring supplemental oxygen 07/22/2010   • Anxiety 06/10/2010   • Insomnia 06/10/2010   • Dyslipidemia 06/10/2010   • GERD (gastroesophageal reflux disease) 06/10/2010   • HTN (hypertension)    • Chronic shoulder pain 08/13/2009   • Right hip pain 08/13/2009   • Multinodular goiter 08/13/2009   Social Hx- single, not working    Allergies:Urea and Tape    Current Outpatient Prescriptions   Medication Sig Dispense Refill   • loperamide (IMODIUM) 2 MG Cap TAKE 1 CAP BY MOUTH 4 TIMES A DAY AS NEEDED. DIARRHEA 60 Cap 1   • vitamin D, Ergocalciferol, (DRISDOL) 56859 UNITS Cap capsule TAKE 1 CAP BY MOUTH EVERY 7 DAYS. 4 Cap 11   • DULERA 100-5 MCG/ACT Aerosol INHALE 2 PUFFS BY MOUTH 2 TIMES A DAY. 13 Inhaler 3   • metoprolol (LOPRESSOR) 25 MG Tab TAKE 1 TABLET BY MOUTH TWICE A DAY 60 Tab 6   •  levothyroxine (SYNTHROID) 75 MCG Tab Take 1 Tab by mouth Every morning on an empty stomach. 30 Tab 11   • salmeterol (SEREVENT) 50 MCG/DOSE AEROSOL POWDER, BREATH ACTIVATED Inhale 1 Puff by mouth 2 times a day. 1 Each 11   • simvastatin (ZOCOR) 20 MG Tab TAKE 1 TABLET BY MOUTH EVERY EVENING 30 Tab 6   • XARELTO 20 MG Tab tablet TAKE 1 TABLET BY MOUTH WITH DINNER 30 Tab 4   • dicyclomine (BENTYL) 10 MG Cap TAKE 1 CAP BY MOUTH 2 TIMES A DAY AS NEEDED. 60 Cap 5   • tiotropium (SPIRIVA) 18 MCG Cap Inhale 1 Cap by mouth every day. 30 Cap 3   • levothyroxine (SYNTHROID) 88 MCG Tab TAKE 1 TABLET BY MOUTH EVERY DAY 30 Tab 6   • ipratropium (ATROVENT) 0.06 % Solution Spray 2 Sprays in nose 2 times a day as needed. 1 Bottle 6   • gabapentin (NEURONTIN) 300 MG Cap TAKE 1 CAPSULE BY MOUTH TWICE DAILY 60 Cap 6   • Linaclotide 145 MCG Cap Take  by mouth.     • Eszopiclone 3 MG Tab Take 3 mg by mouth at bedtime as needed.     • ranitidine (ZANTAC) 150 MG Tab Take 150 mg by mouth 2 times a day.     • simvastatin (ZOCOR) 20 MG Tab Take 20 mg by mouth every evening.     • levothyroxine (SYNTHROID) 88 MCG Tab Take 88 mcg by mouth Every morning on an empty stomach.     • diphenhydrAMINE (BENADRYL) 25 MG Tab Take 25 mg by mouth at bedtime as needed for Sleep.     • duloxetine (CYMBALTA) 60 MG Cap DR Particles delayed-release capsule Take 120 mg by mouth every day.     • folic acid (FOLVITE) 1 MG Tab Take 1 Tab by mouth every day. 30 Tab 3   • ramelteon (ROZEREM) 8 MG tablet Take 8 mg by mouth every evening.     • lorazepam (ATIVAN) 1 MG TABS Take 1 mg by mouth 1 time daily as needed.       No current facility-administered medications for this visit.       Social History   Substance Use Topics   • Smoking status: Former Smoker -- 1.00 packs/day for 25 years     Types: Cigarettes     Quit date: 01/01/2008   • Smokeless tobacco: Never Used   • Alcohol Use: 0.0 oz/week     0 Standard drinks or equivalent per week      Comment: 1 or 2 a mo  "      Family History   Problem Relation Age of Onset   • Heart Disease Mother    • Cancer Father    • Cancer Maternal Aunt    • Arthritis Maternal Grandmother    • Hypertension Maternal Grandmother    • Diabetes Maternal Grandfather    • Heart Disease Maternal Grandfather    • Cancer Paternal Grandfather        ROS:  Review of Systems   Constitutional: Negative for fever, chills, weight loss and malaise/fatigue.   Eyes: Negative for blurred vision.   Respiratory: Negative for cough, sputum production, shortness of breath and wheezing.    Cardiovascular: Negative for chest pain, palpitations, orthopnea and leg swelling.   Gastrointestinal: Negative for heartburn, nausea, vomiting and abdominal pain.   Endo/Heme/Allergies: Does not bruise/bleed easily.               Exam:  Blood pressure 150/62, pulse 64, temperature 36.3 °C (97.3 °F), resp. rate 12, height 1.549 m (5' 1\"), weight 77.565 kg (171 lb), SpO2 97 %.  General:  Well nourished, well developed female in NAD  Head is grossly normal.  Neck: Supple without JVD or bruit. Thyroid is not enlarged. Trachea is midline.  Pulmonary: Clear to ausculation .  Normal effort. No rales, ronchi, or wheezing.  Cardiovascular: Regular rate and rhythm without murmur.  Abdomen-Abdomen is soft, normal bowel sounds, no masses, guarding, ororganomegaly, or tenderness.  Lower extremities- neg for pretibial edema, redness, tenderness.  Shoulders-1+ tender to palpation over the entrance. Aspect of the left shoulder with full range of motion in regards to abduction and flexion although patient reports some pain with those motions. Right shoulder shows angular deformity of the upper outline of the shoulder with a palpable step-off. Recent plain x-ray notable for dislocation of the prosthetic misti-ball from the cup which is in place on her scapula. Large anterior scar which is healed and closed with no pinkness or drainage on the right side.    Please note that this dictation was created " using voice recognition software. I have made every reasonable attempt to correct obvious errors, but I expect that there are errors of grammar and possibly content that I did not discover before finalizing the note.    Assessment/Plan:  1. Dislocated shoulder, left, initial encounter     2. Paroxysmal atrial fibrillation (CMS-HCC)     3. Essential hypertension     4. Gastroesophageal reflux disease without esophagitis     5. Chronic right shoulder pain       Plan: 1. Recommend continuing to take any ibuprofen with food S patient is now currently doing  2. Monitor current blood pressure situation which may be reactive-will not increase metoprolol at this time due to low normal pulse rate  3. Revisit in one month

## 2017-02-07 NOTE — ASSESSMENT & PLAN NOTE
Patient reports with increased bilateral shoulder pain in the past 2 weeks she has been taking four 200 mg of over-the-counter ibuprofen twice daily along with 3 Tylenol twice a day, taken with at least a light snack or meal. She is not reporting any increase in her occasional substernal burning heartburn symptoms. Denies any current epigastric pain. No black or bloody stools. Is continuing to take ranitidine.

## 2017-02-07 NOTE — ASSESSMENT & PLAN NOTE
No recent palpitations. Patient is compliant taking her xarelto, and low-dose metoprolol. Denies near syncope. Did notice lightheadedness presumably due to accidentally taking a double dose of gabapentin at 800 mg.

## 2017-02-07 NOTE — ASSESSMENT & PLAN NOTE
Patient reports worsening pain in her right shoulder over the past 2 weeks. She did consult with Dr. Roa who obtained a new plain x-ray which shows complete disarticulation of the ball portion of her prosthesis from the cup which is in place on her scapula, right shoulder. Patient also reports over the past 2 weeks she's had increased pain over the anterior and superior aspects of her left shoulder as well  She remains on antibiotic for chronic infection of the right shoulder (cefdinir). She did see Dr. Adhikari in the past 2 weeks along with Dr. Roa, orthopedic surgery. She has had a prior surgeries on her right shoulder. No current unexplained fevers. She is considering asking for limited use of Norco, she sees Dr. Sims for pain management. She also is taking 400 mg of gabapentin twice daily would consider taking it 3 times a day if it does not make her dizzy-that happened after a single accidental double dose of 800 mg on a past occasion. She is also smoking marijuana approximately 3 times a day for alternative pain relief.

## 2017-02-14 ENCOUNTER — ANTICOAGULATION MONITORING (OUTPATIENT)
Dept: VASCULAR LAB | Facility: MEDICAL CENTER | Age: 72
End: 2017-02-14

## 2017-02-14 ENCOUNTER — HOSPITAL ENCOUNTER (OUTPATIENT)
Dept: RADIOLOGY | Facility: MEDICAL CENTER | Age: 72
End: 2017-02-14
Attending: FAMILY MEDICINE
Payer: MEDICARE

## 2017-02-14 DIAGNOSIS — I48.0 PAROXYSMAL ATRIAL FIBRILLATION (HCC): ICD-10-CM

## 2017-02-14 PROCEDURE — 77063 BREAST TOMOSYNTHESIS BI: CPT

## 2017-02-16 ENCOUNTER — TELEPHONE (OUTPATIENT)
Dept: VASCULAR LAB | Facility: MEDICAL CENTER | Age: 72
End: 2017-02-16

## 2017-02-16 NOTE — TELEPHONE ENCOUNTER
[facility]    Left VM for pt to contact the clinic to f/u with anticoagulation.    David Herring, RUBAD

## 2017-03-06 ENCOUNTER — OFFICE VISIT (OUTPATIENT)
Dept: MEDICAL GROUP | Facility: MEDICAL CENTER | Age: 72
End: 2017-03-06
Attending: FAMILY MEDICINE
Payer: MEDICARE

## 2017-03-06 VITALS
RESPIRATION RATE: 16 BRPM | TEMPERATURE: 97.4 F | DIASTOLIC BLOOD PRESSURE: 80 MMHG | BODY MASS INDEX: 32.28 KG/M2 | HEIGHT: 61 IN | SYSTOLIC BLOOD PRESSURE: 120 MMHG | OXYGEN SATURATION: 96 % | WEIGHT: 171 LBS | HEART RATE: 68 BPM

## 2017-03-06 DIAGNOSIS — E78.5 DYSLIPIDEMIA: ICD-10-CM

## 2017-03-06 DIAGNOSIS — G89.29 CHRONIC RIGHT SHOULDER PAIN: Chronic | ICD-10-CM

## 2017-03-06 DIAGNOSIS — I48.0 PAROXYSMAL ATRIAL FIBRILLATION (HCC): ICD-10-CM

## 2017-03-06 DIAGNOSIS — K21.9 GASTROESOPHAGEAL REFLUX DISEASE WITHOUT ESOPHAGITIS: ICD-10-CM

## 2017-03-06 DIAGNOSIS — M25.511 CHRONIC RIGHT SHOULDER PAIN: Chronic | ICD-10-CM

## 2017-03-06 DIAGNOSIS — I10 ESSENTIAL HYPERTENSION: ICD-10-CM

## 2017-03-06 DIAGNOSIS — E03.8 OTHER SPECIFIED HYPOTHYROIDISM: ICD-10-CM

## 2017-03-06 DIAGNOSIS — T84.59XD PROSTHETIC SHOULDER INFECTION, SUBSEQUENT ENCOUNTER: ICD-10-CM

## 2017-03-06 DIAGNOSIS — R19.7 DIARRHEA, UNSPECIFIED TYPE: ICD-10-CM

## 2017-03-06 DIAGNOSIS — R06.02 SHORTNESS OF BREATH: ICD-10-CM

## 2017-03-06 DIAGNOSIS — Z96.619 PROSTHETIC SHOULDER INFECTION, SUBSEQUENT ENCOUNTER: ICD-10-CM

## 2017-03-06 DIAGNOSIS — F33.41 MAJOR DEPRESSIVE DISORDER, RECURRENT EPISODE, IN PARTIAL REMISSION (HCC): ICD-10-CM

## 2017-03-06 PROCEDURE — 99212 OFFICE O/P EST SF 10 MIN: CPT | Performed by: FAMILY MEDICINE

## 2017-03-06 PROCEDURE — G0438 PPPS, INITIAL VISIT: HCPCS | Performed by: FAMILY MEDICINE

## 2017-03-06 PROCEDURE — 99213 OFFICE O/P EST LOW 20 MIN: CPT | Performed by: FAMILY MEDICINE

## 2017-03-06 RX ORDER — LOPERAMIDE HYDROCHLORIDE 2 MG/1
CAPSULE ORAL
Qty: 60 CAP | Refills: 6 | Status: SHIPPED | OUTPATIENT
Start: 2017-03-06 | End: 2017-08-09 | Stop reason: SDUPTHER

## 2017-03-06 RX ORDER — GABAPENTIN 600 MG/1
600 TABLET ORAL 3 TIMES DAILY
COMMUNITY

## 2017-03-06 ASSESSMENT — PATIENT HEALTH QUESTIONNAIRE - PHQ9: CLINICAL INTERPRETATION OF PHQ2 SCORE: 2

## 2017-03-06 ASSESSMENT — PAIN SCALES - GENERAL: PAINLEVEL: 6=MODERATE PAIN

## 2017-03-06 NOTE — PROGRESS NOTES
No chief complaint on file.        HPI:  Ora is a 71 y.o. here for Medicare Annual Wellness Visit     Patient Active Problem List    Diagnosis Date Noted   • Major depressive disorder, recurrent episode, in partial remission (CMS-HCC) 12/09/2016   • Shortness of breath 12/09/2016   • Abdominal pain, epigastric 09/22/2016   • Abdominal pain, bilateral lower quadrant 04/19/2016   • Cough 10/16/2015   • Paroxysmal atrial fibrillation (CMS-HCC) 09/11/2015   • Prosthetic shoulder infection (CMS-HCC) 09/11/2015   • Macrocytic anemia 09/11/2015   • Diarrhea 08/29/2015   • Hypokalemia 08/29/2015   • Chronic allergic rhinitis 06/02/2015   • Lumbar pain 03/06/2015   • Bilateral hand pain 01/29/2015   • Marijuana use 04/24/2013   • Osteopenia 04/27/2012   • Helicobacter pylori ab+ treated w/ prevpack in june 2011 09/22/2011   • Hypothyroid 04/15/2011   • Hypoxemia requiring supplemental oxygen 07/22/2010   • Anxiety 06/10/2010   • Insomnia 06/10/2010   • Dyslipidemia 06/10/2010   • GERD (gastroesophageal reflux disease) 06/10/2010   • HTN (hypertension)    • Chronic shoulder pain 08/13/2009   • Right hip pain 08/13/2009   • Multinodular goiter 08/13/2009       Current Outpatient Prescriptions   Medication Sig Dispense Refill   • loperamide (IMODIUM) 2 MG Cap TAKE 1 CAP BY MOUTH 4 TIMES A DAY AS NEEDED. DIARRHEA 60 Cap 1   • vitamin D, Ergocalciferol, (DRISDOL) 03551 UNITS Cap capsule TAKE 1 CAP BY MOUTH EVERY 7 DAYS. 4 Cap 11   • DULERA 100-5 MCG/ACT Aerosol INHALE 2 PUFFS BY MOUTH 2 TIMES A DAY. 13 Inhaler 3   • metoprolol (LOPRESSOR) 25 MG Tab TAKE 1 TABLET BY MOUTH TWICE A DAY 60 Tab 6   • levothyroxine (SYNTHROID) 75 MCG Tab Take 1 Tab by mouth Every morning on an empty stomach. 30 Tab 11   • salmeterol (SEREVENT) 50 MCG/DOSE AEROSOL POWDER, BREATH ACTIVATED Inhale 1 Puff by mouth 2 times a day. 1 Each 11   • simvastatin (ZOCOR) 20 MG Tab TAKE 1 TABLET BY MOUTH EVERY EVENING 30 Tab 6   • XARELTO 20 MG Tab tablet TAKE 1  TABLET BY MOUTH WITH DINNER 30 Tab 4   • dicyclomine (BENTYL) 10 MG Cap TAKE 1 CAP BY MOUTH 2 TIMES A DAY AS NEEDED. 60 Cap 5   • tiotropium (SPIRIVA) 18 MCG Cap Inhale 1 Cap by mouth every day. 30 Cap 3   • levothyroxine (SYNTHROID) 88 MCG Tab TAKE 1 TABLET BY MOUTH EVERY DAY 30 Tab 6   • ipratropium (ATROVENT) 0.06 % Solution Spray 2 Sprays in nose 2 times a day as needed. 1 Bottle 6   • gabapentin (NEURONTIN) 300 MG Cap TAKE 1 CAPSULE BY MOUTH TWICE DAILY 60 Cap 6   • Linaclotide 145 MCG Cap Take  by mouth.     • Eszopiclone 3 MG Tab Take 3 mg by mouth at bedtime as needed.     • ranitidine (ZANTAC) 150 MG Tab Take 150 mg by mouth 2 times a day.     • simvastatin (ZOCOR) 20 MG Tab Take 20 mg by mouth every evening.     • levothyroxine (SYNTHROID) 88 MCG Tab Take 88 mcg by mouth Every morning on an empty stomach.     • diphenhydrAMINE (BENADRYL) 25 MG Tab Take 25 mg by mouth at bedtime as needed for Sleep.     • duloxetine (CYMBALTA) 60 MG Cap DR Particles delayed-release capsule Take 120 mg by mouth every day.     • folic acid (FOLVITE) 1 MG Tab Take 1 Tab by mouth every day. 30 Tab 3   • ramelteon (ROZEREM) 8 MG tablet Take 8 mg by mouth every evening.     • lorazepam (ATIVAN) 1 MG TABS Take 1 mg by mouth 1 time daily as needed.       No current facility-administered medications for this visit.            Current supplements as per medication list.       Allergies: Urea and Tape    Current social contact/activities: freq contact with son     She  reports that she quit smoking about 9 years ago. Her smoking use included Cigarettes. She has a 25 pack-year smoking history. She has never used smokeless tobacco. She reports that she drinks alcohol. She reports that she uses illicit drugs.  Counseling given: Not Answered        DPA/Advanced directive: Patient does have an advanced directive. If not on file, instructed to bring in a copy to scan into their chart. If no advanced directive exists, a packet and workshop  information was given on advanced directives.     ROS:    Gait: Uses no assistive device   Ostomy: no   Other tubes: no   Amputations: no   Chronic oxygen use no   Last eye exam Feb 2017   : Denies incontinence.       Screening:    Depression Screening    Little interest or pleasure in doing things?  2 - more than half the days  Feeling down, depressed , or hopeless? 0 - not at all  Trouble falling or staying asleep, or sleeping too much?     Feeling tired or having little energy?     Poor appetite or overeating?     Feeling bad about yourself - or that you are a failure or have let yourself or your family down?    Trouble concentrating on things, such as reading the newspaper or watching television?    Moving or speaking so slowly that other people could have noticed.  Or the opposite - being so fidgety or restless that you have been moving around a lot more than usual?     Thoughts that you would be better off dead, or of hurting yourself?     Patient Health Questionnaire Score:      If depressive symptoms identified deferred to follow up visit unless specifically addressed in assesment and plan.      Screening for Cognitive Impairment    Three Minute Recall (banana, sunrise, fence)  3/3    Draw clock face with all 12 numbers set to the hand to show 10 minures past 11 o'clock  1    Cognitive concerns identified defferred for follow up unless specifically addressed in assesment and plan.    Fall Risk Assessment    Has the patient had two or more falls in the last year or any fall with injury in the last year?  No    Safety Assessment    Throw rugs on floor.  No  Handrails on all stairs.  Yes  Good lighting in all hallways.  Yes  Difficulty hearing.  Yes  Patient counseled about all safety risks that were identified.    Functional Assessment ADLs    Are there any barriers preventing you from cooking for yourself or meeting nutritional needs?  Yes. Good food is relatively expensive  Are there any barriers  preventing you from driving safely or obtaining transportation?  No.    Are there any barriers preventing you from using a telephone or calling for help?  No.    Are there any barriers preventing you from shopping?  No.    Are there any barriers preventing you from taking care of your own finances?  No.    Are there any barriers preventing you from managing your medications?  No.    Are currently engaing any exercise or physical activity?  Yes.  Restricted due to chronic right shoulder pain/in affected prosthesis    Health Maintenance Summary                Annual Wellness Visit Overdue 1945     IMM DTaP/Tdap/Td Vaccine Overdue 11/5/1966     PAP SMEAR Overdue 11/5/1968     IMM ZOSTER VACCINE Overdue 11/5/2007     IMM PNEUMOCOCCAL 65+ (ADULT) LOW/MEDIUM RISK SERIES Overdue 9/1/2016      Done 9/1/2015 Imm Admin: Pneumococcal Conjugate Vaccine (Prevnar/PCV-13)    MAMMOGRAM Next Due 2/14/2018      Done 2/14/2017 MA-MAMMO SCREENING BILAT W/TOMOSYNTHESIS W/CAD     Patient has more history with this topic...    BONE DENSITY Next Due 9/30/2019      Done 9/30/2014 DS-BONE DENSITY STUDY (DEXA)     Patient has more history with this topic...    COLONOSCOPY Next Due 2/6/2023      Previously completed 2/6/2013 6 months ago. [facility].      Patient has more history with this topic...          Patient Care Team:  Jenaro Treviño M.D. as PCP - General (Family Medicine)      Social History   Substance Use Topics   • Smoking status: Former Smoker -- 1.00 packs/day for 25 years     Types: Cigarettes     Quit date: 01/01/2008   • Smokeless tobacco: Never Used   • Alcohol Use: 0.0 oz/week     0 Standard drinks or equivalent per week      Comment: 1 or 2 a mo     Family History   Problem Relation Age of Onset   • Heart Disease Mother    • Cancer Father    • Cancer Maternal Aunt    • Arthritis Maternal Grandmother    • Hypertension Maternal Grandmother    • Diabetes Maternal Grandfather    • Heart Disease Maternal Grandfather     • Cancer Paternal Grandfather      She  has a past medical history of HTN (hypertension); Chronic shoulder pain; Chronic LBP; Dyslipidemia; Fibroadenoma nos; Vitamin D deficiency; Hip pain; Constipation (8/13/2009); Right hip pain (8/13/2009); Genital warts; Hiatal hernia; GERD (gastroesophageal reflux disease); Multinodular goiter; HTN (hypertension); Other and unspecified hyperlipidemia; Pulmonary hypertension (CMS-HCC); Psychiatric problem; Indigestion; Fall; Arthritis; Other specified symptom associated with female genital organs; Snoring; CATARACT; Dental disorder; Pneumonia; Cold; Heart burn; Unspecified disorder of thyroid; Pain; Urinary bladder disorder; Scoliosis; ASTHMA; EMPHYSEMA; Bronchitis; Other specified disorder of intestines; and Breath shortness. She also has no past medical history of Breast cancer (CMS-HCC).   Past Surgical History   Procedure Laterality Date   • Tonsillectomy and adenoidectomy  1973   • Open reduction  1994     Right humerus   • Abdominal hysterectomy total  1983     Endometriosis   • Gastroscopy-endo  6/22/2011     Performed by DENICE KENDRICK at ENDOSCOPY [facility]   • Thyroidectomy total  12/14/2011     Performed by [place] ONOFRE at SURGERY SAME DAY [facility]   • Other abdominal surgery  2007     andrei   • Vulvectomy partial  1/30/2014     Performed by Celso Harley M.D. at SURGERY [facility]   • Wide excision  1/30/2014     Performed by Celso Harley M.D. at SURGERY [facility]       Exam:     There were no vitals taken for this visit. There is no weight on file to calculate BMI.    Hearing -diminished bilat.    Dentition upper and lower dentures  Alert, oriented in no acute distress.  Eye contact is good, speech goal directed, affect calm      Assessment and Plan. The following treatment and monitoring plan is recommended:    1. Anxiety-stable, rarely using lorazepam.  2. Major depression-stable, taking 120 mg of duloxetine  3. Chronic right  shoulder pain/prosthetic shoulder infection-stable clinical situation with no overt signs of infection. Patient has ongoing follow-up with Dr. Adhikari, and orthopedics. She also utilizes marijuana daily but no narcotics for pain control and is followed by Dr. Sims  4. Hypertension-controlled with current medication  5. Paroxysmal atrial fibrillation-stable, anticoagulated with xarelto. Asymptomatic  6. Chronic diarrhea-controlled with daily use of Imodium. Patient has follow-up with Dr. Sanchez, GI, in April 7. Dyspnea-patient is able to walk her dog twice daily without significant shortness of breath. She reports only getting dyspneic if she tries to hurry too much. No evidence of anemia, unremarkable chest x-ray, compliant using current inhalers. No further workup anticipated unless clinical situation deteriorates  8. Dyslipidemia-stable, compliant taking the simvastatin    Services needed: as per orders if indicated.  Health Care Screening: Age-appropriate preventive services Medicare covers discussed today and ordered if indicated.    Referrals offered: Community-based lifestyle interventions to reduce health risks and promote self-management and wellness, fall prevention, nutrition, physical activity, tobacco-use cessation, weight loss, and mental health services as per orders if indicated.    Discussion today about general wellness and lifestyle habits:    · Prevent falls and reduce trip hazards; Cautioned about securing or removing rugs.  · Have a working fire alarm and carbon monoxide detector;   · Engage in regular physical activity and social activities       Follow-up:   1. GI follow-up with Dr. Sanchez, mid April, regarding updating colonoscopy (5 years), and recent diarrhea  2. Ongoing follow-up with infectious disease-Dr. Adhikari regarding chronic right shoulder prosthesis   infection   3. Obtain current TSH on 75 µg levothyroxine now ×2 months    Through

## 2017-03-06 NOTE — MR AVS SNAPSHOT
"        Ora Martinez   3/6/2017 3:10 PM   Office Visit   MRN: [id number]    [facility]   Dept Phone:  [phone]    Description:  Female : 1945   Provider:  Jenaro Treviño M.D.           Reason for Visit     Shoulder Pain           Allergies as of 3/6/2017     Allergen Noted Reactions    Urea 2014       IN CREAMS AND HAIR PRODUCTS    Tape 2015   Rash    Redness        You were diagnosed with     Chronic right shoulder pain   [079259]       Essential hypertension   [5830372]       Dyslipidemia   [128711]       Gastroesophageal reflux disease without esophagitis   [194310]       Other specified hypothyroidism   [3772408]       Diarrhea, unspecified type   [9978554]       Paroxysmal atrial fibrillation (CMS-HCC)   [807735]       Prosthetic shoulder infection, subsequent encounter   [961097]       Major depressive disorder, recurrent episode, in partial remission (CMS-HCC)   [315802]       Shortness of breath   [786.05.ICD-9-CM]         Vital Signs     Blood Pressure Pulse Temperature Respirations Height Weight    120/80 mmHg 68 36.3 °C (97.4 °F) 16 1.549 m (5' 1\") 77.565 kg (171 lb)    Body Mass Index Oxygen Saturation Smoking Status             32.33 kg/m2 96% Former Smoker         Basic Information     Date Of Birth Sex Race Ethnicity Preferred Language    1945 Female White Non- English      Problem List              ICD-10-CM Priority Class Noted - Resolved    Chronic shoulder pain (Chronic) M25.519, G89.29   2009 - Present    Right hip pain (Chronic) M25.551   2009 - Present    Multinodular goiter E04.2   2009 - Present    HTN (hypertension) I10   Unknown - Present    Anxiety F41.9   6/10/2010 - Present    Insomnia G47.00   6/10/2010 - Present    Dyslipidemia E78.5   6/10/2010 - Present    GERD (gastroesophageal reflux disease) K21.9   6/10/2010 - Present    Hypoxemia requiring supplemental oxygen R09.02, Z99.81   2010 - Present   " Hypothyroid E03.9   4/15/2011 - Present    Helicobacter pylori ab+ treated w/ prevpack in june 2011 R76.8   9/22/2011 - Present    Osteopenia M85.80   4/27/2012 - Present    Marijuana use F12.10   4/24/2013 - Present    Bilateral hand pain M79.641, M79.642   1/29/2015 - Present    Lumbar pain M54.5   3/6/2015 - Present    Chronic allergic rhinitis J30.9   6/2/2015 - Present    Diarrhea R19.7   8/29/2015 - Present    Hypokalemia E87.6   8/29/2015 - Present    Paroxysmal atrial fibrillation (CMS-HCC) I48.0   9/11/2015 - Present    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, Z96.619   9/11/2015 - Present    Macrocytic anemia D53.9   9/11/2015 - Present    Cough R05   10/16/2015 - Present    Abdominal pain, bilateral lower quadrant    4/19/2016 - Present    Abdominal pain, epigastric R10.13   9/22/2016 - Present    Major depressive disorder, recurrent episode, in partial remission (CMS-HCC) F33.41   12/9/2016 - Present    Shortness of breath R06.02   12/9/2016 - Present      Health Maintenance        Date Due Completion Dates    IMM DTaP/Tdap/Td Vaccine (1 - Tdap) 11/5/1966 ---    PAP SMEAR 11/5/1968 ---    IMM ZOSTER VACCINE 11/5/2007 ---    IMM PNEUMOCOCCAL 65+ (ADULT) LOW/MEDIUM RISK SERIES (2 of 2 - PPSV23) 9/1/2016 9/1/2015    MAMMOGRAM 2/14/2018 2/14/2017, 9/30/2014, 3/8/2013, 10/4/2011, 4/2/2010, 4/2/2010, 9/9/2008, 9/9/2008, 5/25/2007, 5/25/2007, 2/10/2006, 3/19/2004    BONE DENSITY 9/30/2019 9/30/2014, 4/26/2012, 7/16/2008, 1/11/2007    COLONOSCOPY 2/6/2023 2/6/2013 (Prv Comp), 2/6/2013 (Prv Comp)    Override on 2/6/2013: Previously completed (6 months ago. [facility]. )    Override on 2/6/2013: Previously completed (correction done 2/2012. due 2/2017)            Current Immunizations     13-VALENT PCV PREVNAR 9/1/2015  5:42 AM    INFLUENZA VACCINE H1N1 12/18/2009    Influenza TIV (IM) 12/10/2013, 10/18/2012, 10/20/2011, 11/10/2010    Influenza Vaccine Pediatric 12/18/2009    Influenza Vaccine Quad  Inj (Pf) 11/11/2016, 10/12/2015, 10/16/2014    Pneumococcal Vaccine (UF)Historical Data 11/1/2010      Below and/or attached are the medications your provider expects you to take. Review all of your home medications and newly ordered medications with your provider and/or pharmacist. Follow medication instructions as directed by your provider and/or pharmacist. Please keep your medication list with you and share with your provider. Update the information when medications are discontinued, doses are changed, or new medications (including over-the-counter products) are added; and carry medication information at all times in the event of emergency situations     Allergies:  UREA - (reactions not documented)     TAPE - Rash               Medications  Valid as of: March 06, 2017 -  5:01 PM    Generic Name Brand Name Tablet Size Instructions for use    Dicyclomine HCl (Cap) BENTYL 10 MG TAKE 1 CAP BY MOUTH 2 TIMES A DAY AS NEEDED.        DiphenhydrAMINE HCl (Tab) BENADRYL 25 MG Take 25 mg by mouth at bedtime as needed for Sleep.        DULoxetine HCl (Cap DR Particles) CYMBALTA 60 MG Take 120 mg by mouth every day.        Ergocalciferol (Cap) DRISDOL 06398 UNITS TAKE 1 CAP BY MOUTH EVERY 7 DAYS.        Eszopiclone (Tab) Eszopiclone 3 MG Take 3 mg by mouth at bedtime as needed.        Folic Acid (Tab) FOLVITE 1 MG Take 1 Tab by mouth every day.        Gabapentin (Tab) NEURONTIN 600 MG Take 600 mg by mouth 3 times a day.        Ipratropium Bromide (Solution) ATROVENT 0.06 % Spray 2 Sprays in nose 2 times a day as needed.        Levothyroxine Sodium (Tab) SYNTHROID 88 MCG Take 88 mcg by mouth Every morning on an empty stomach.        Levothyroxine Sodium (Tab) SYNTHROID 75 MCG Take 1 Tab by mouth Every morning on an empty stomach.        Linaclotide (Cap) Linaclotide 145 MCG Take  by mouth.        Loperamide HCl (Cap) IMODIUM 2 MG TAKE 1 CAP BY MOUTH 4 TIMES A DAY AS NEEDED. DIARRHEA        LORazepam (Tab) ATIVAN 1 MG Take 1  mg by mouth 1 time daily as needed.        Metoprolol Tartrate (Tab) LOPRESSOR 25 MG TAKE 1 TABLET BY MOUTH TWICE A DAY        Ramelteon (Tab) ROZEREM 8 MG Take 8 mg by mouth every evening.        RaNITidine HCl (Tab) ZANTAC 150 MG Take 150 mg by mouth 2 times a day.        Rivaroxaban (Tab) XARELTO 20 MG TAKE 1 TABLET BY MOUTH WITH DINNER        Salmeterol Xinafoate (AEROSOL POWDER, BREATH ACTIVATED) SEREVENT 50 MCG/DOSE Inhale 1 Puff by mouth 2 times a day.        Simvastatin (Tab) ZOCOR 20 MG TAKE 1 TABLET BY MOUTH EVERY EVENING        Tiotropium Bromide Monohydrate (Cap) SPIRIVA 18 MCG Inhale 1 Cap by mouth every day.        .                 Medicines prescribed today were sent to:     [facility]ACY #[id number] - [place], [place] - [address] [address]    [address] [facility] [address] [place] [address]    Phone: [phone] Fax: [phone]    Open 24 Hours?: No      Medication refill instructions:       If your prescription bottle indicates you have medication refills left, it is not necessary to call your provider’s office. Please contact your pharmacy and they will refill your medication.    If your prescription bottle indicates you do not have any refills left, you may request refills at any time through one of the following ways: The online [employer] system (except Urgent Care), by calling your provider’s office, or by asking your pharmacy to contact your provider’s office with a refill request. Medication refills are processed only during regular business hours and may not be available until the next business day. Your provider may request additional information or to have a follow-up visit with you prior to refilling your medication.   *Please Note: Medication refills are assigned a new Rx number when refilled electronically. Your pharmacy may indicate that no refills were authorized even though a new prescription for the same medication is available at the pharmacy. Please request the medicine by name with the pharmacy before  contacting your provider for a refill.        Your To Do List     Future Labs/Procedures Complete By Expires    TSH  As directed 3/7/2018      Other Notes About Your Plan     Clarify clonidine at FU appt- ?HTN vs hot flashes  UDS +marijuana. Pt refusing pain management/cessation of marijuana, wean off morphine  On metoclopramide--patient aware of risk of tardive dyskinesia   Screening mammogram due 3/2014           MyChart Status: Patient Declined

## 2017-03-13 ENCOUNTER — TELEPHONE (OUTPATIENT)
Dept: VASCULAR LAB | Facility: MEDICAL CENTER | Age: 72
End: 2017-03-13

## 2017-03-13 NOTE — TELEPHONE ENCOUNTER
[facility]    Left message for pt to contact the clinic to f/u with anticoagulation.  Last PCP note states to continue Xarelto for PAF, renal indices appear appropriate.    David Herring, RUBAD

## 2017-03-30 NOTE — TELEPHONE ENCOUNTER
Was the patient seen in the last year in this department? No     Does patient have an active prescription for medications requested? Yes     Received Request Via: Pharmacy

## 2017-04-03 RX ORDER — DULOXETIN HYDROCHLORIDE 60 MG/1
120 CAPSULE, DELAYED RELEASE ORAL DAILY
Qty: 30 CAP | Refills: 0 | OUTPATIENT
Start: 2017-04-03

## 2017-04-06 ENCOUNTER — TELEPHONE (OUTPATIENT)
Dept: MEDICAL GROUP | Facility: MEDICAL CENTER | Age: 72
End: 2017-04-06

## 2017-04-06 ENCOUNTER — HOSPITAL ENCOUNTER (OUTPATIENT)
Dept: LAB | Facility: MEDICAL CENTER | Age: 72
End: 2017-04-06
Attending: FAMILY MEDICINE
Payer: MEDICARE

## 2017-04-06 DIAGNOSIS — E03.8 OTHER SPECIFIED HYPOTHYROIDISM: ICD-10-CM

## 2017-04-06 LAB — TSH SERPL DL<=0.005 MIU/L-ACNC: 0.97 UIU/ML (ref 0.3–3.7)

## 2017-04-06 PROCEDURE — 84443 ASSAY THYROID STIM HORMONE: CPT

## 2017-04-06 PROCEDURE — 36415 COLL VENOUS BLD VENIPUNCTURE: CPT

## 2017-04-07 NOTE — TELEPHONE ENCOUNTER
Phone Number Called: [phone] (home)       Message:Pt informed of the following results: TSH now normal range, continue on 75 mcg levothyroxine, retest within 12 mos     Left Message for patient to call back: N\A

## 2017-04-07 NOTE — TELEPHONE ENCOUNTER
----- Message from Jenaro Treviño M.D. sent at 4/6/2017  1:31 PM PDT -----  TSH now normal range, continue on 75 mcg levothyroxine, retest within 12 mos

## 2017-04-25 ENCOUNTER — OFFICE VISIT (OUTPATIENT)
Dept: MEDICAL GROUP | Facility: MEDICAL CENTER | Age: 72
End: 2017-04-25
Attending: FAMILY MEDICINE
Payer: MEDICARE

## 2017-04-25 VITALS
HEIGHT: 61 IN | TEMPERATURE: 97.8 F | BODY MASS INDEX: 32.47 KG/M2 | HEART RATE: 64 BPM | OXYGEN SATURATION: 97 % | DIASTOLIC BLOOD PRESSURE: 78 MMHG | WEIGHT: 172 LBS | RESPIRATION RATE: 16 BRPM | SYSTOLIC BLOOD PRESSURE: 126 MMHG

## 2017-04-25 DIAGNOSIS — R53.83 FATIGUE, UNSPECIFIED TYPE: ICD-10-CM

## 2017-04-25 DIAGNOSIS — R73.09 ELEVATED GLUCOSE LEVEL: ICD-10-CM

## 2017-04-25 DIAGNOSIS — E66.9 OBESITY (BMI 30-39.9): ICD-10-CM

## 2017-04-25 DIAGNOSIS — I48.0 PAROXYSMAL ATRIAL FIBRILLATION (HCC): ICD-10-CM

## 2017-04-25 DIAGNOSIS — M79.2 PERIPHERAL NEUROPATHIC PAIN: ICD-10-CM

## 2017-04-25 PROCEDURE — 3014F SCREEN MAMMO DOC REV: CPT | Performed by: FAMILY MEDICINE

## 2017-04-25 PROCEDURE — 1036F TOBACCO NON-USER: CPT | Performed by: FAMILY MEDICINE

## 2017-04-25 PROCEDURE — G8510 SCR DEP NEG, NO PLAN REQD: HCPCS | Performed by: FAMILY MEDICINE

## 2017-04-25 PROCEDURE — 99214 OFFICE O/P EST MOD 30 MIN: CPT | Performed by: FAMILY MEDICINE

## 2017-04-25 PROCEDURE — 4040F PNEUMOC VAC/ADMIN/RCVD: CPT | Performed by: FAMILY MEDICINE

## 2017-04-25 PROCEDURE — G8419 CALC BMI OUT NRM PARAM NOF/U: HCPCS | Performed by: FAMILY MEDICINE

## 2017-04-25 PROCEDURE — 1101F PT FALLS ASSESS-DOCD LE1/YR: CPT | Performed by: FAMILY MEDICINE

## 2017-04-25 PROCEDURE — 99212 OFFICE O/P EST SF 10 MIN: CPT | Performed by: FAMILY MEDICINE

## 2017-04-25 ASSESSMENT — PAIN SCALES - GENERAL: PAINLEVEL: NO PAIN

## 2017-04-25 NOTE — MR AVS SNAPSHOT
"        Ora Salcido Martinez   2017 3:10 PM   Office Visit   MRN: [id number]    [facility]   Dept Phone:  [phone]    Description:  Female : 1945   Provider:  Jenaro Treviño M.D.           Allergies as of 2017     Allergen Noted Reactions    Urea 2014       IN CREAMS AND HAIR PRODUCTS    Tape 2015   Rash    Redness        You were diagnosed with     Obesity (BMI 30-39.9)   [516944]       Fatigue, unspecified type   [5863888]       Peripheral neuropathic pain (CMS-HCC)   [7376794]       Paroxysmal atrial fibrillation (CMS-HCC)   [390219]       Elevated glucose level   [0006602]         Vital Signs     Blood Pressure Pulse Temperature Respirations Height Weight    126/78 mmHg 64 36.6 °C (97.8 °F) 16 1.549 m (5' 1\") 78.019 kg (172 lb)    Body Mass Index Oxygen Saturation Smoking Status             32.52 kg/m2 97% Former Smoker         Basic Information     Date Of Birth Sex Race Ethnicity Preferred Language    1945 Female White Non- English      Problem List              ICD-10-CM Priority Class Noted - Resolved    Chronic shoulder pain (Chronic) M25.519, G89.29   2009 - Present    Right hip pain (Chronic) M25.551   2009 - Present    Multinodular goiter E04.2   2009 - Present    HTN (hypertension) I10   Unknown - Present    Anxiety F41.9   6/10/2010 - Present    Insomnia G47.00   6/10/2010 - Present    Dyslipidemia E78.5   6/10/2010 - Present    GERD (gastroesophageal reflux disease) K21.9   6/10/2010 - Present    Hypoxemia requiring supplemental oxygen R09.02, Z99.81   2010 - Present    Hypothyroid E03.9   4/15/2011 - Present    Helicobacter pylori ab+ treated w/ prevpack in 2011 R76.8   2011 - Present    Osteopenia M85.80   2012 - Present    Marijuana use F12.10   2013 - Present    Bilateral hand pain M79.641, M79.642   2015 - Present    Lumbar pain M54.5   3/6/2015 - Present    Chronic allergic rhinitis " J30.9   6/2/2015 - Present    Diarrhea R19.7   8/29/2015 - Present    Hypokalemia E87.6   8/29/2015 - Present    Paroxysmal atrial fibrillation (CMS-HCC) I48.0   9/11/2015 - Present    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, Z96.619   9/11/2015 - Present    Macrocytic anemia D53.9   9/11/2015 - Present    Cough R05   10/16/2015 - Present    Abdominal pain, bilateral lower quadrant    4/19/2016 - Present    Abdominal pain, epigastric R10.13   9/22/2016 - Present    Major depressive disorder, recurrent episode, in partial remission (CMS-HCC) F33.41   12/9/2016 - Present    Shortness of breath R06.02   12/9/2016 - Present    Fatigue R53.83   4/25/2017 - Present      Health Maintenance        Date Due Completion Dates    IMM DTaP/Tdap/Td Vaccine (1 - Tdap) 11/5/1966 ---    PAP SMEAR 11/5/1968 ---    IMM ZOSTER VACCINE 11/5/2007 ---    IMM PNEUMOCOCCAL 65+ (ADULT) LOW/MEDIUM RISK SERIES (2 of 2 - PPSV23) 9/1/2016 9/1/2015    MAMMOGRAM 2/14/2018 2/14/2017, 9/30/2014, 3/8/2013, 10/4/2011, 4/2/2010, 4/2/2010, 9/9/2008, 9/9/2008, 5/25/2007, 5/25/2007, 2/10/2006, 3/19/2004    BONE DENSITY 9/30/2019 9/30/2014, 4/26/2012, 7/16/2008, 1/11/2007    COLONOSCOPY 2/6/2023 2/6/2013 (Prv Comp), 2/6/2013 (Prv Comp)    Override on 2/6/2013: Previously completed (6 months ago. [facility]. )    Override on 2/6/2013: Previously completed (correction done 2/2012. due 2/2017)            Current Immunizations     13-VALENT PCV PREVNAR 9/1/2015  5:42 AM    INFLUENZA VACCINE H1N1 12/18/2009    Influenza TIV (IM) 12/10/2013, 10/18/2012, 10/20/2011, 11/10/2010    Influenza Vaccine Pediatric 12/18/2009    Influenza Vaccine Quad Inj (Pf) 11/11/2016, 10/12/2015, 10/16/2014    Pneumococcal Vaccine (UF)Historical Data 11/1/2010      Below and/or attached are the medications your provider expects you to take. Review all of your home medications and newly ordered medications with your provider and/or pharmacist. Follow medication  instructions as directed by your provider and/or pharmacist. Please keep your medication list with you and share with your provider. Update the information when medications are discontinued, doses are changed, or new medications (including over-the-counter products) are added; and carry medication information at all times in the event of emergency situations     Allergies:  UREA - (reactions not documented)     TAPE - Rash               Medications  Valid as of: April 25, 2017 -  4:13 PM    Generic Name Brand Name Tablet Size Instructions for use    Dicyclomine HCl (Cap) BENTYL 10 MG TAKE 1 CAP BY MOUTH 2 TIMES A DAY AS NEEDED.        DiphenhydrAMINE HCl (Tab) BENADRYL 25 MG Take 25 mg by mouth at bedtime as needed for Sleep.        DULoxetine HCl (Cap DR Particles) CYMBALTA 60 MG Take 120 mg by mouth every day.        Ergocalciferol (Cap) DRISDOL 56871 UNITS TAKE 1 CAP BY MOUTH EVERY 7 DAYS.        Eszopiclone (Tab) Eszopiclone 3 MG Take 3 mg by mouth at bedtime as needed.        Folic Acid (Tab) FOLVITE 1 MG Take 1 Tab by mouth every day.        Gabapentin (Tab) NEURONTIN 600 MG Take 600 mg by mouth 3 times a day.        Ipratropium Bromide (Solution) ATROVENT 0.06 % Spray 2 Sprays in nose 2 times a day as needed.        Levothyroxine Sodium (Tab) SYNTHROID 88 MCG Take 88 mcg by mouth Every morning on an empty stomach.        Levothyroxine Sodium (Tab) SYNTHROID 75 MCG Take 1 Tab by mouth Every morning on an empty stomach.        Linaclotide (Cap) Linaclotide 145 MCG Take  by mouth.        Loperamide HCl (Cap) IMODIUM 2 MG TAKE 1 CAP BY MOUTH 4 TIMES A DAY AS NEEDED. DIARRHEA        LORazepam (Tab) ATIVAN 1 MG Take 1 mg by mouth 1 time daily as needed.        Metoprolol Tartrate (Tab) LOPRESSOR 25 MG TAKE 1 TABLET BY MOUTH TWICE A DAY        Ramelteon (Tab) ROZEREM 8 MG Take 8 mg by mouth every evening.        RaNITidine HCl (Tab) ZANTAC 150 MG Take 150 mg by mouth 2 times a day.        Rivaroxaban (Tab) XARELTO  20 MG TAKE 1 TABLET BY MOUTH WITH DINNER        Salmeterol Xinafoate (AEROSOL POWDER, BREATH ACTIVATED) SEREVENT 50 MCG/DOSE Inhale 1 Puff by mouth 2 times a day.        Simvastatin (Tab) ZOCOR 20 MG TAKE 1 TABLET BY MOUTH EVERY EVENING        Tiotropium Bromide Monohydrate (Cap) SPIRIVA 18 MCG Inhale 1 Cap by mouth every day.        .                 Medicines prescribed today were sent to:     [facility][id number] - [place], [place] - [address]    [address] [place] [address]    Phone: [phone] Fax: [phone]    Open 24 Hours?: No      Medication refill instructions:       If your prescription bottle indicates you have medication refills left, it is not necessary to call your provider’s office. Please contact your pharmacy and they will refill your medication.    If your prescription bottle indicates you do not have any refills left, you may request refills at any time through one of the following ways: The online [facility] system (except Urgent Care), by calling your provider’s office, or by asking your pharmacy to contact your provider’s office with a refill request. Medication refills are processed only during regular business hours and may not be available until the next business day. Your provider may request additional information or to have a follow-up visit with you prior to refilling your medication.   *Please Note: Medication refills are assigned a new Rx number when refilled electronically. Your pharmacy may indicate that no refills were authorized even though a new prescription for the same medication is available at the pharmacy. Please request the medicine by name with the pharmacy before contacting your provider for a refill.        Your To Do List     Future Labs/Procedures Complete By Expires    CBC WITH DIFFERENTIAL  As directed 4/26/2018    COMP METABOLIC PANEL  As directed 4/26/2018    HEMOGLOBIN A1C  As directed 4/26/2018      Other Notes About Your Plan     Clarify clonidine  at FU appt- ?HTN vs hot flashes  UDS +marijuana. Pt refusing pain management/cessation of marijuana, wean off morphine  On metoclopramide--patient aware of risk of tardive dyskinesia   Screening mammogram due 3/2014           Ranit Status: Patient Declined

## 2017-04-25 NOTE — PROGRESS NOTES
Chief Complaint   Patient presents with   • Fatigue       HISTORY OF PRESENT ILLNESS: Patient is a 71 y.o. female established patient who presents today to follow-up on fatigue, paroxysmal atrial fibrillation, bilateral foot pain        Fatigue  Patient reports a persistent sense of fatigue and relative lack of energy involved in her activities of daily living over the past several months. She takes levothyroxine 75 µg per day and recent TSH 2 weeks ago was normal range. She denies severe depression or tearfulness. She is taking 120 mg of Cymbalta daily. She does have numerous ongoing health issues including a potentially infected right shoulder joint which has had the previous prosthesis removed from it, low back pain with upcoming lumbar MRI, IBS. She denies current cough or dyspnea.    Paroxysmal atrial fibrillation  Patient ports that she feels as if she is having brief palpitations perhaps twice per week. Has a history of paroxysmal atrial fibrillation. She is treated with Xarelto chronically. Denies current chest pain or chest pressure with her palpitations.    Peripheral neuropathic pain (CMS-HCC)  Patient reports intermittent burning and pain in the soles of either foot. Pain is been present for 3-6 months. She does not notice accompanying swelling or discoloration specifically linked with the pain. Arms are not similarly involved. Patient is wondering if she had diabetes but A1c in December was 6.2. She does have chronic lumbar pain and is getting a new lumbar MRI to evaluate her low back anatomy. Gait is normal.      Patient Active Problem List    Diagnosis Date Noted   • Fatigue 04/25/2017   • Peripheral neuropathic pain (CMS-HCC) 04/25/2017   • Major depressive disorder, recurrent episode, in partial remission (CMS-[facility]) 12/09/2016   • Shortness of breath 12/09/2016   • Abdominal pain, epigastric 09/22/2016   • Abdominal pain, bilateral lower quadrant 04/19/2016   • Cough 10/16/2015   • Paroxysmal atrial  fibrillation (CMS-HCC) 09/11/2015   • Prosthetic shoulder infection (CMS-HCC) 09/11/2015   • Macrocytic anemia 09/11/2015   • Diarrhea 08/29/2015   • Hypokalemia 08/29/2015   • Chronic allergic rhinitis 06/02/2015   • Lumbar pain 03/06/2015   • Bilateral hand pain 01/29/2015   • Marijuana use 04/24/2013   • Osteopenia 04/27/2012   • Helicobacter pylori ab+ treated w/ prevpack in june 2011 09/22/2011   • Hypothyroid 04/15/2011   • Hypoxemia requiring supplemental oxygen 07/22/2010   • Anxiety 06/10/2010   • Insomnia 06/10/2010   • Dyslipidemia 06/10/2010   • GERD (gastroesophageal reflux disease) 06/10/2010   • HTN (hypertension)    • Chronic shoulder pain 08/13/2009   • Right hip pain 08/13/2009   • Multinodular goiter 08/13/2009    Social History-single, not working    Allergies:Urea and Tape    Current Outpatient Prescriptions   Medication Sig Dispense Refill   • gabapentin (NEURONTIN) 600 MG tablet Take 600 mg by mouth 3 times a day.     • loperamide (IMODIUM) 2 MG Cap TAKE 1 CAP BY MOUTH 4 TIMES A DAY AS NEEDED. DIARRHEA 60 Cap 6   • vitamin D, Ergocalciferol, (DRISDOL) 85316 UNITS Cap capsule TAKE 1 CAP BY MOUTH EVERY 7 DAYS. 4 Cap 11   • metoprolol (LOPRESSOR) 25 MG Tab TAKE 1 TABLET BY MOUTH TWICE A DAY 60 Tab 6   • levothyroxine (SYNTHROID) 75 MCG Tab Take 1 Tab by mouth Every morning on an empty stomach. 30 Tab 11   • salmeterol (SEREVENT) 50 MCG/DOSE AEROSOL POWDER, BREATH ACTIVATED Inhale 1 Puff by mouth 2 times a day. 1 Each 11   • simvastatin (ZOCOR) 20 MG Tab TAKE 1 TABLET BY MOUTH EVERY EVENING 30 Tab 6   • XARELTO 20 MG Tab tablet TAKE 1 TABLET BY MOUTH WITH DINNER 30 Tab 4   • dicyclomine (BENTYL) 10 MG Cap TAKE 1 CAP BY MOUTH 2 TIMES A DAY AS NEEDED. 60 Cap 5   • tiotropium (SPIRIVA) 18 MCG Cap Inhale 1 Cap by mouth every day. 30 Cap 3   • ipratropium (ATROVENT) 0.06 % Solution Spray 2 Sprays in nose 2 times a day as needed. 1 Bottle 6   • Linaclotide 145 MCG Cap Take  by mouth.     • Eszopiclone 3  "MG Tab Take 3 mg by mouth at bedtime as needed.     • ranitidine (ZANTAC) 150 MG Tab Take 150 mg by mouth 2 times a day.     • levothyroxine (SYNTHROID) 88 MCG Tab Take 88 mcg by mouth Every morning on an empty stomach.     • diphenhydrAMINE (BENADRYL) 25 MG Tab Take 25 mg by mouth at bedtime as needed for Sleep.     • duloxetine (CYMBALTA) 60 MG Cap DR Particles delayed-release capsule Take 120 mg by mouth every day.     • folic acid (FOLVITE) 1 MG Tab Take 1 Tab by mouth every day. 30 Tab 3   • ramelteon (ROZEREM) 8 MG tablet Take 8 mg by mouth every evening.     • lorazepam (ATIVAN) 1 MG TABS Take 1 mg by mouth 1 time daily as needed.       No current facility-administered medications for this visit.       Social History   Substance Use Topics   • Smoking status: Former Smoker -- 1.00 packs/day for 25 years     Types: Cigarettes     Quit date: 01/01/2008   • Smokeless tobacco: Never Used   • Alcohol Use: 0.0 oz/week     0 Standard drinks or equivalent per week      Comment: 1 or 2 a mo       Family History   Problem Relation Age of Onset   • Heart Disease Mother    • Cancer Father    • Cancer Maternal Aunt    • Arthritis Maternal Grandmother    • Hypertension Maternal Grandmother    • Diabetes Maternal Grandfather    • Heart Disease Maternal Grandfather    • Cancer Paternal Grandfather        ROS:  Review of Systems   Constitutional: Negative for fever, chills, weight loss    Eyes: Negative for blurred vision.   Respiratory: Negative for cough, sputum production, shortness of breath and wheezing.    Cardiovascular: Negative for chest pain, orthopnea  Gastrointestinal: Negative for heartburn, nausea, vomiting and abdominal pain.                 Exam:  Blood pressure 126/78, pulse 64, temperature 36.6 °C (97.8 °F), resp. rate 16, height 1.549 m (5' 1\"), weight 78.019 kg (172 lb), SpO2 97 %.  General:  Well nourished, well developed female in NAD  Head is grossly normal.  Neck: Supple without JVD or bruit. Thyroid " is not enlarged. Trachea is midline.  Pulmonary: Clear to ausculation .  Normal effort. No rales, ronchi, or wheezing.  Cardiovascular: Regular rate and rhythm without murmur.  Abdomen-Abdomen is soft, normal bowel sounds, no masses, guarding, ororganomegaly, or tenderness.  Lower extremities- neg for pretibial edema, redness. Palpation over the tops and bottoms of her feet at this moment are nontender. Normal skin temperature without edema.      Please note that this dictation was created using voice recognition software. I have made every reasonable attempt to correct obvious errors, but I expect that there are errors of grammar and possibly content that I did not discover before finalizing the note.    Assessment/Plan:  1. Obesity (BMI 30-39.9)  Patient identified as having weight management issue.  Appropriate orders and counseling given.   2. Fatigue, unspecified type  COMP METABOLIC PANEL    CBC WITH DIFFERENTIAL   3. Peripheral neuropathic pain (CMS-HCC)     4. Paroxysmal atrial fibrillation (CMS-HCC)     5. Elevated glucose level  HEMOGLOBIN A1C     Plan: 1. Collect A1c, CMP, CBC  2. Discussed limiting portions by 15-20% achieved gradual weight loss  3. Pursue lumbar MRI with follow-up at Dr. Sims's office  4. Continue recently increased dose of gabapentin 800 mg 3 times a day

## 2017-04-25 NOTE — ASSESSMENT & PLAN NOTE
Patient ports that she feels as if she is having brief palpitations perhaps twice per week. Has a history of paroxysmal atrial fibrillation. She is treated with Xarelto chronically. Denies current chest pain or chest pressure with her palpitations.

## 2017-04-25 NOTE — ASSESSMENT & PLAN NOTE
Patient reports a persistent sense of fatigue and relative lack of energy involved in her activities of daily living over the past several months. She takes levothyroxine 75 µg per day and recent TSH 2 weeks ago was normal range. She denies severe depression or tearfulness. She is taking 120 mg of Cymbalta daily. She does have numerous ongoing health issues including a potentially infected right shoulder joint which has had the previous prosthesis removed from it, low back pain with upcoming lumbar MRI, IBS. She denies current cough or dyspnea.

## 2017-04-25 NOTE — ASSESSMENT & PLAN NOTE
Patient reports intermittent burning and pain in the soles of either foot. Pain is been present for 3-6 months. She does not notice accompanying swelling or discoloration specifically linked with the pain. Arms are not similarly involved. Patient is wondering if she had diabetes but A1c in December was 6.2. She does have chronic lumbar pain and is getting a new lumbar MRI to evaluate her low back anatomy. Gait is normal.

## 2017-05-01 RX ORDER — RIVAROXABAN 20 MG/1
TABLET, FILM COATED ORAL
Qty: 30 TAB | Refills: 4 | Status: SHIPPED | OUTPATIENT
Start: 2017-05-01 | End: 2017-08-09 | Stop reason: SDUPTHER

## 2017-05-04 RX ORDER — DICYCLOMINE HYDROCHLORIDE 10 MG/1
CAPSULE ORAL
Qty: 60 CAP | Refills: 5 | Status: SHIPPED | OUTPATIENT
Start: 2017-05-04 | End: 2017-08-09 | Stop reason: SDUPTHER

## 2017-05-11 ENCOUNTER — HOSPITAL ENCOUNTER (OUTPATIENT)
Dept: LAB | Facility: MEDICAL CENTER | Age: 72
End: 2017-05-11
Attending: FAMILY MEDICINE
Payer: MEDICARE

## 2017-05-11 ENCOUNTER — HOSPITAL ENCOUNTER (OUTPATIENT)
Dept: RADIOLOGY | Facility: MEDICAL CENTER | Age: 72
End: 2017-05-11
Attending: NURSE PRACTITIONER
Payer: MEDICARE

## 2017-05-11 DIAGNOSIS — M47.816 OSTEOARTHRITIS OF LUMBAR SPINE, UNSPECIFIED SPINAL OSTEOARTHRITIS COMPLICATION STATUS: ICD-10-CM

## 2017-05-11 DIAGNOSIS — R53.83 FATIGUE, UNSPECIFIED TYPE: ICD-10-CM

## 2017-05-11 DIAGNOSIS — R73.09 ELEVATED GLUCOSE LEVEL: ICD-10-CM

## 2017-05-11 LAB
ALBUMIN SERPL BCP-MCNC: 4.1 G/DL (ref 3.2–4.9)
ALBUMIN/GLOB SERPL: 1.4 G/DL
ALP SERPL-CCNC: 92 U/L (ref 30–99)
ALT SERPL-CCNC: 30 U/L (ref 2–50)
ANION GAP SERPL CALC-SCNC: 9 MMOL/L (ref 0–11.9)
AST SERPL-CCNC: 29 U/L (ref 12–45)
BASOPHILS # BLD AUTO: 0.9 % (ref 0–1.8)
BASOPHILS # BLD: 0.08 K/UL (ref 0–0.12)
BILIRUB SERPL-MCNC: 0.4 MG/DL (ref 0.1–1.5)
BUN SERPL-MCNC: 13 MG/DL (ref 8–22)
CALCIUM SERPL-MCNC: 8.7 MG/DL (ref 8.5–10.5)
CHLORIDE SERPL-SCNC: 107 MMOL/L (ref 96–112)
CO2 SERPL-SCNC: 22 MMOL/L (ref 20–33)
CREAT SERPL-MCNC: 0.73 MG/DL (ref 0.5–1.4)
EOSINOPHIL # BLD AUTO: 1.01 K/UL (ref 0–0.51)
EOSINOPHIL NFR BLD: 11.1 % (ref 0–6.9)
ERYTHROCYTE [DISTWIDTH] IN BLOOD BY AUTOMATED COUNT: 55.6 FL (ref 35.9–50)
EST. AVERAGE GLUCOSE BLD GHB EST-MCNC: 134 MG/DL
GFR SERPL CREATININE-BSD FRML MDRD: >60 ML/MIN/1.73 M 2
GLOBULIN SER CALC-MCNC: 3 G/DL (ref 1.9–3.5)
GLUCOSE SERPL-MCNC: 105 MG/DL (ref 65–99)
HBA1C MFR BLD: 6.3 % (ref 0–5.6)
HCT VFR BLD AUTO: 36.9 % (ref 37–47)
HGB BLD-MCNC: 12.1 G/DL (ref 12–16)
IMM GRANULOCYTES # BLD AUTO: 0 K/UL (ref 0–0.11)
IMM GRANULOCYTES NFR BLD AUTO: 0 % (ref 0–0.9)
LYMPHOCYTES # BLD AUTO: 2.34 K/UL (ref 1–4.8)
LYMPHOCYTES NFR BLD: 25.7 % (ref 22–41)
MCH RBC QN AUTO: 34.2 PG (ref 27–33)
MCHC RBC AUTO-ENTMCNC: 32.8 G/DL (ref 33.6–35)
MCV RBC AUTO: 104.2 FL (ref 81.4–97.8)
MONOCYTES # BLD AUTO: 0.89 K/UL (ref 0–0.85)
MONOCYTES NFR BLD AUTO: 9.8 % (ref 0–13.4)
NEUTROPHILS # BLD AUTO: 4.77 K/UL (ref 2–7.15)
NEUTROPHILS NFR BLD: 52.5 % (ref 44–72)
NRBC # BLD AUTO: 0 K/UL
NRBC BLD AUTO-RTO: 0 /100 WBC
PLATELET # BLD AUTO: 290 K/UL (ref 164–446)
PMV BLD AUTO: 11.4 FL (ref 9–12.9)
POTASSIUM SERPL-SCNC: 4.6 MMOL/L (ref 3.6–5.5)
PROT SERPL-MCNC: 7.1 G/DL (ref 6–8.2)
RBC # BLD AUTO: 3.54 M/UL (ref 4.2–5.4)
SODIUM SERPL-SCNC: 138 MMOL/L (ref 135–145)
WBC # BLD AUTO: 9.1 K/UL (ref 4.8–10.8)

## 2017-05-11 PROCEDURE — 72110 X-RAY EXAM L-2 SPINE 4/>VWS: CPT

## 2017-05-11 PROCEDURE — 72148 MRI LUMBAR SPINE W/O DYE: CPT

## 2017-05-12 ENCOUNTER — TELEPHONE (OUTPATIENT)
Dept: MEDICAL GROUP | Facility: MEDICAL CENTER | Age: 72
End: 2017-05-12

## 2017-05-12 DIAGNOSIS — D75.89 MACROCYTOSIS WITHOUT ANEMIA: ICD-10-CM

## 2017-05-12 RX ORDER — IPRATROPIUM BROMIDE 42 UG/1
SPRAY, METERED NASAL
Qty: 1 BOTTLE | Refills: 6 | Status: SHIPPED | OUTPATIENT
Start: 2017-05-12 | End: 2018-02-09 | Stop reason: SDUPTHER

## 2017-05-15 ENCOUNTER — TELEPHONE (OUTPATIENT)
Dept: MEDICAL GROUP | Facility: MEDICAL CENTER | Age: 72
End: 2017-05-15

## 2017-05-15 NOTE — TELEPHONE ENCOUNTER
Phone Number Called: [phone] (home)       Message:Pt informed of the following results: Excellent diabetes control with A1c of 6.3. Red blood cell count has fallen to low normal with large red blood cells. Will check a B-12 and serum folate level. Normal kidney and liver function.     Labs show continued excellent diabetes control with A1c at 6.3. Normal kidney and liver function. Red blood cell count is just at lower end of normal. Recommend remeasurement of folate and serum B-12 since red blood cell individual size is increasing which can be associated with deficiencies of those 2 vitamins. Order sent        Left Message for patient to call back: N\A

## 2017-05-15 NOTE — TELEPHONE ENCOUNTER
----- Message from Jenaro Treviño M.D. sent at 5/12/2017  5:13 PM PDT -----  Labs show continued excellent diabetes control with A1c at 6.3. Normal kidney and liver function. Red blood cell count is just at lower end of normal. Recommend remeasurement of folate and serum B-12 since red blood cell individual size is increasing which can be associated with deficiencies of those 2 vitamins. Order sent

## 2017-05-16 ENCOUNTER — HOSPITAL ENCOUNTER (OUTPATIENT)
Dept: LAB | Facility: MEDICAL CENTER | Age: 72
End: 2017-05-16
Attending: FAMILY MEDICINE
Payer: MEDICARE

## 2017-05-16 DIAGNOSIS — D75.89 MACROCYTOSIS WITHOUT ANEMIA: ICD-10-CM

## 2017-05-16 LAB
FOLATE SERPL-MCNC: >23.7 NG/ML
VIT B12 SERPL-MCNC: 467 PG/ML (ref 211–911)

## 2017-05-16 PROCEDURE — 82746 ASSAY OF FOLIC ACID SERUM: CPT

## 2017-05-16 PROCEDURE — 82607 VITAMIN B-12: CPT

## 2017-05-16 PROCEDURE — 36415 COLL VENOUS BLD VENIPUNCTURE: CPT

## 2017-05-17 ENCOUNTER — TELEPHONE (OUTPATIENT)
Dept: VASCULAR LAB | Facility: MEDICAL CENTER | Age: 72
End: 2017-05-17

## 2017-05-17 NOTE — TELEPHONE ENCOUNTER
[facility]    Pt refuses to be seen in clinic.  Pt denies any unusual s/s of bleeding, bruising, clotting or any changes to medications.   CrCl ~80 ml/min, no dose changes suggested.    Follow up in 6 months.    David Herring, PHARMD

## 2017-05-23 ENCOUNTER — APPOINTMENT (OUTPATIENT)
Dept: PHYSICAL THERAPY | Facility: REHABILITATION | Age: 72
End: 2017-05-23
Attending: NURSE PRACTITIONER
Payer: MEDICARE

## 2017-05-23 ENCOUNTER — APPOINTMENT (OUTPATIENT)
Dept: PHYSICAL THERAPY | Facility: REHABILITATION | Age: 72
End: 2017-05-23
Payer: MEDICARE

## 2017-05-24 ENCOUNTER — TELEPHONE (OUTPATIENT)
Dept: MEDICAL GROUP | Facility: MEDICAL CENTER | Age: 72
End: 2017-05-24

## 2017-05-24 NOTE — TELEPHONE ENCOUNTER
----- Message from Jenaro Treviño M.D. sent at 5/19/2017  8:56 AM PDT -----  Lab shows normal folate and serum B12 levels.

## 2017-05-24 NOTE — TELEPHONE ENCOUNTER
Phone Number Called: [phone] (home)       Message: called patient and left a message about her results.     Left Message for patient to call back: no

## 2017-05-26 ENCOUNTER — APPOINTMENT (OUTPATIENT)
Dept: PHYSICAL THERAPY | Facility: REHABILITATION | Age: 72
End: 2017-05-26
Payer: MEDICARE

## 2017-05-30 ENCOUNTER — APPOINTMENT (OUTPATIENT)
Dept: PHYSICAL THERAPY | Facility: REHABILITATION | Age: 72
End: 2017-05-30
Payer: MEDICARE

## 2017-06-02 ENCOUNTER — APPOINTMENT (OUTPATIENT)
Dept: PHYSICAL THERAPY | Facility: REHABILITATION | Age: 72
End: 2017-06-02
Payer: MEDICARE

## 2017-06-06 ENCOUNTER — APPOINTMENT (OUTPATIENT)
Dept: PHYSICAL THERAPY | Facility: REHABILITATION | Age: 72
End: 2017-06-06
Payer: MEDICARE

## 2017-06-07 ENCOUNTER — HOSPITAL ENCOUNTER (OUTPATIENT)
Dept: PHYSICAL THERAPY | Facility: REHABILITATION | Age: 72
End: 2017-06-07
Attending: NURSE PRACTITIONER
Payer: MEDICARE

## 2017-06-07 PROCEDURE — 97162 PT EVAL MOD COMPLEX 30 MIN: CPT

## 2017-06-07 PROCEDURE — 97012 MECHANICAL TRACTION THERAPY: CPT

## 2017-06-07 PROCEDURE — G8978 MOBILITY CURRENT STATUS: HCPCS | Mod: CK

## 2017-06-07 PROCEDURE — G8979 MOBILITY GOAL STATUS: HCPCS | Mod: CI

## 2017-06-09 ENCOUNTER — APPOINTMENT (OUTPATIENT)
Dept: PHYSICAL THERAPY | Facility: REHABILITATION | Age: 72
End: 2017-06-09
Payer: MEDICARE

## 2017-06-13 ENCOUNTER — APPOINTMENT (OUTPATIENT)
Dept: PHYSICAL THERAPY | Facility: REHABILITATION | Age: 72
End: 2017-06-13
Payer: MEDICARE

## 2017-06-16 ENCOUNTER — APPOINTMENT (OUTPATIENT)
Dept: PHYSICAL THERAPY | Facility: REHABILITATION | Age: 72
End: 2017-06-16
Payer: MEDICARE

## 2017-06-16 ENCOUNTER — APPOINTMENT (OUTPATIENT)
Dept: PHYSICAL THERAPY | Facility: REHABILITATION | Age: 72
End: 2017-06-16
Attending: NURSE PRACTITIONER
Payer: MEDICARE

## 2017-06-20 ENCOUNTER — APPOINTMENT (OUTPATIENT)
Dept: PHYSICAL THERAPY | Facility: REHABILITATION | Age: 72
End: 2017-06-20
Payer: MEDICARE

## 2017-06-20 ENCOUNTER — HOSPITAL ENCOUNTER (OUTPATIENT)
Dept: PHYSICAL THERAPY | Facility: REHABILITATION | Age: 72
End: 2017-06-20
Attending: NURSE PRACTITIONER
Payer: MEDICARE

## 2017-06-20 PROCEDURE — 97012 MECHANICAL TRACTION THERAPY: CPT

## 2017-06-20 PROCEDURE — 97140 MANUAL THERAPY 1/> REGIONS: CPT | Mod: XU

## 2017-06-23 ENCOUNTER — APPOINTMENT (OUTPATIENT)
Dept: PHYSICAL THERAPY | Facility: REHABILITATION | Age: 72
End: 2017-06-23
Attending: NURSE PRACTITIONER
Payer: MEDICARE

## 2017-06-27 ENCOUNTER — HOSPITAL ENCOUNTER (OUTPATIENT)
Dept: PHYSICAL THERAPY | Facility: REHABILITATION | Age: 72
End: 2017-06-27
Attending: NURSE PRACTITIONER
Payer: MEDICARE

## 2017-06-27 PROCEDURE — 97012 MECHANICAL TRACTION THERAPY: CPT

## 2017-06-27 PROCEDURE — 97110 THERAPEUTIC EXERCISES: CPT

## 2017-06-27 PROCEDURE — 97140 MANUAL THERAPY 1/> REGIONS: CPT | Mod: XU

## 2017-06-30 ENCOUNTER — HOSPITAL ENCOUNTER (OUTPATIENT)
Dept: PHYSICAL THERAPY | Facility: REHABILITATION | Age: 72
End: 2017-06-30
Attending: NURSE PRACTITIONER
Payer: MEDICARE

## 2017-06-30 PROCEDURE — 97140 MANUAL THERAPY 1/> REGIONS: CPT | Mod: XU

## 2017-06-30 PROCEDURE — 97110 THERAPEUTIC EXERCISES: CPT

## 2017-06-30 PROCEDURE — 97012 MECHANICAL TRACTION THERAPY: CPT

## 2017-07-06 ENCOUNTER — HOSPITAL ENCOUNTER (OUTPATIENT)
Dept: PHYSICAL THERAPY | Facility: REHABILITATION | Age: 72
End: 2017-07-06
Attending: NURSE PRACTITIONER
Payer: MEDICARE

## 2017-07-06 PROCEDURE — 97012 MECHANICAL TRACTION THERAPY: CPT

## 2017-07-06 PROCEDURE — 97110 THERAPEUTIC EXERCISES: CPT

## 2017-07-06 PROCEDURE — 97140 MANUAL THERAPY 1/> REGIONS: CPT | Mod: XU

## 2017-07-11 ENCOUNTER — APPOINTMENT (OUTPATIENT)
Dept: PHYSICAL THERAPY | Facility: REHABILITATION | Age: 72
End: 2017-07-11
Attending: NURSE PRACTITIONER
Payer: MEDICARE

## 2017-07-14 ENCOUNTER — HOSPITAL ENCOUNTER (OUTPATIENT)
Dept: PHYSICAL THERAPY | Facility: REHABILITATION | Age: 72
End: 2017-07-14
Attending: NURSE PRACTITIONER
Payer: MEDICARE

## 2017-07-14 PROCEDURE — 97110 THERAPEUTIC EXERCISES: CPT

## 2017-07-14 PROCEDURE — 97140 MANUAL THERAPY 1/> REGIONS: CPT

## 2017-07-17 ENCOUNTER — APPOINTMENT (OUTPATIENT)
Dept: PHYSICAL THERAPY | Facility: REHABILITATION | Age: 72
End: 2017-07-17
Attending: NURSE PRACTITIONER
Payer: MEDICARE

## 2017-07-18 RX ORDER — SIMVASTATIN 20 MG
TABLET ORAL
Qty: 30 TAB | Refills: 6 | Status: SHIPPED | OUTPATIENT
Start: 2017-07-18 | End: 2018-02-27 | Stop reason: SDUPTHER

## 2017-07-21 ENCOUNTER — HOSPITAL ENCOUNTER (OUTPATIENT)
Dept: PHYSICAL THERAPY | Facility: REHABILITATION | Age: 72
End: 2017-07-21
Attending: NURSE PRACTITIONER
Payer: MEDICARE

## 2017-08-10 RX ORDER — DICYCLOMINE HYDROCHLORIDE 10 MG/1
10 CAPSULE ORAL 2 TIMES DAILY PRN
Qty: 60 CAP | Refills: 0 | Status: SHIPPED | OUTPATIENT
Start: 2017-08-10 | End: 2017-10-18 | Stop reason: SDUPTHER

## 2017-08-10 RX ORDER — LOPERAMIDE HYDROCHLORIDE 2 MG/1
CAPSULE ORAL
Qty: 60 CAP | Refills: 0 | Status: SHIPPED | OUTPATIENT
Start: 2017-08-10 | End: 2017-09-21 | Stop reason: SDUPTHER

## 2017-08-11 ENCOUNTER — APPOINTMENT (OUTPATIENT)
Dept: PHYSICAL THERAPY | Facility: REHABILITATION | Age: 72
End: 2017-08-11
Attending: NURSE PRACTITIONER
Payer: MEDICARE

## 2017-08-16 ENCOUNTER — ANTICOAGULATION MONITORING (OUTPATIENT)
Dept: VASCULAR LAB | Facility: MEDICAL CENTER | Age: 72
End: 2017-08-16

## 2017-08-29 ENCOUNTER — TELEPHONE (OUTPATIENT)
Dept: MEDICAL GROUP | Facility: MEDICAL CENTER | Age: 72
End: 2017-08-29

## 2017-08-30 NOTE — TELEPHONE ENCOUNTER
1. Caller Name: Ora                                         Call Back Number: [phone] (home)         Patient approves a detailed voicemail message: yes    2. What are the patient's symptoms (location & severity)? Left breast pain, inverted nipple    3. Is this a new symptom Yes    4. When did it start? Shortly after having a lump removed from her breast    5. Action taken per Active Symptom Guide: Pt would like to know what steps she can make to resolve this issue, she is in [place] taking care of her mother. Please advise    6. Patient agrees to recommended action per active symptom guide

## 2017-08-30 NOTE — TELEPHONE ENCOUNTER
Can use local heat, may need to check with her surgeon if her excision was in past 10d if she thinks the 2 could be related. Office eval is another possibility.   Dr RHODES

## 2017-09-08 DIAGNOSIS — I48.91 ATRIAL FIBRILLATION, UNSPECIFIED TYPE (HCC): ICD-10-CM

## 2017-09-21 RX ORDER — LOPERAMIDE HYDROCHLORIDE 2 MG/1
CAPSULE ORAL
Qty: 20 CAP | Refills: 0 | Status: SHIPPED | OUTPATIENT
Start: 2017-09-21 | End: 2017-10-05 | Stop reason: SDUPTHER

## 2017-10-19 RX ORDER — DICYCLOMINE HYDROCHLORIDE 10 MG/1
10 CAPSULE ORAL 2 TIMES DAILY PRN
Qty: 60 CAP | Refills: 0 | Status: SHIPPED | OUTPATIENT
Start: 2017-10-19 | End: 2017-11-20 | Stop reason: SDUPTHER

## 2017-11-02 ENCOUNTER — OFFICE VISIT (OUTPATIENT)
Dept: MEDICAL GROUP | Facility: MEDICAL CENTER | Age: 72
End: 2017-11-02
Attending: FAMILY MEDICINE
Payer: MEDICARE

## 2017-11-02 VITALS
BODY MASS INDEX: 32.47 KG/M2 | OXYGEN SATURATION: 97 % | HEART RATE: 64 BPM | RESPIRATION RATE: 16 BRPM | WEIGHT: 172 LBS | SYSTOLIC BLOOD PRESSURE: 130 MMHG | TEMPERATURE: 97.3 F | HEIGHT: 61 IN | DIASTOLIC BLOOD PRESSURE: 82 MMHG

## 2017-11-02 DIAGNOSIS — R06.02 SHORTNESS OF BREATH: ICD-10-CM

## 2017-11-02 DIAGNOSIS — Z23 FLU VACCINE NEED: ICD-10-CM

## 2017-11-02 DIAGNOSIS — F33.41 MAJOR DEPRESSIVE DISORDER, RECURRENT EPISODE, IN PARTIAL REMISSION (HCC): ICD-10-CM

## 2017-11-02 DIAGNOSIS — E66.9 OBESITY (BMI 30-39.9): ICD-10-CM

## 2017-11-02 DIAGNOSIS — N95.1 HOT FLASHES DUE TO MENOPAUSE: ICD-10-CM

## 2017-11-02 DIAGNOSIS — R06.00 DYSPNEA, UNSPECIFIED TYPE: ICD-10-CM

## 2017-11-02 PROCEDURE — 99214 OFFICE O/P EST MOD 30 MIN: CPT | Mod: 25 | Performed by: FAMILY MEDICINE

## 2017-11-02 PROCEDURE — 99213 OFFICE O/P EST LOW 20 MIN: CPT | Performed by: FAMILY MEDICINE

## 2017-11-02 RX ORDER — ESTRADIOL 1 MG/1
1 TABLET ORAL DAILY
Qty: 30 TAB | Refills: 5 | Status: SHIPPED | OUTPATIENT
Start: 2017-11-02 | End: 2018-04-29 | Stop reason: SDUPTHER

## 2017-11-02 RX ORDER — CEFDINIR 300 MG/1
300 CAPSULE ORAL 2 TIMES DAILY
Qty: 60 CAP | Refills: 0 | Status: SHIPPED | OUTPATIENT
Start: 2017-11-02 | End: 2017-12-05

## 2017-11-02 ASSESSMENT — PAIN SCALES - GENERAL: PAINLEVEL: 7=MODERATE-SEVERE PAIN

## 2017-11-03 NOTE — ASSESSMENT & PLAN NOTE
Patient reports worsening shortness of breath and generalized decreased energy and fatigue over the past 4 months. She has gained about 20 pounds in the past 1 year. She has been compliant taking her thyroid supplement. She notes a persistent cough over the past 2 years with minimal sputum production. She does smoke marijuana several times per day for pain relief. She does not take any narcotics.

## 2017-11-03 NOTE — ASSESSMENT & PLAN NOTE
Patient reports increased frequency of intense hot flashes over the past 2-3 months. These are accompanied with diaphoresis. She does not record any fevers suggestive of infection. She reports taking estrogen replacement for possibly one year several decades ago at the time of her total abdominal hysterectomy. She does not have any history of cancer.

## 2017-11-03 NOTE — ASSESSMENT & PLAN NOTE
Patient reports gradually increasing feelings recently of depression. She denies suicidal ideation. She has been compliant taking Cymbalta 120 mg daily. She is followed at [facility]. She denies confusion. She is out of town frequently taking care of her mother who is frail and developing dementia.

## 2017-11-03 NOTE — PROGRESS NOTES
Chief Complaint   Patient presents with   • Stress       HISTORY OF PRESENT ILLNESS: Patient is a 71 y.o. female established patient who presents today toFollow-up on depression, worsening shortness of breath, worsening hot flashes.        Major depressive disorder, recurrent episode, in partial remission (CMS-HCC)  Patient reports gradually increasing feelings recently of depression. She denies suicidal ideation. She has been compliant taking Cymbalta 120 mg daily. She is followed at [facility]. She denies confusion. She is out of town frequently taking care of her mother who is frail and developing dementia.    Shortness of breath  Patient reports worsening shortness of breath and generalized decreased energy and fatigue over the past 4 months. She has gained about 20 pounds in the past 1 year. She has been compliant taking her thyroid supplement. She notes a persistent cough over the past 2 years with minimal sputum production. She does smoke marijuana several times per day for pain relief. She does not take any narcotics.    Hot flashes due to menopause  Patient reports increased frequency of intense hot flashes over the past 2-3 months. These are accompanied with diaphoresis. She does not record any fevers suggestive of infection. She reports taking estrogen replacement for possibly one year several decades ago at the time of her total abdominal hysterectomy. She does not have any history of cancer.      Patient Active Problem List    Diagnosis Date Noted   • Hot flashes due to menopause 11/02/2017   • Fatigue 04/25/2017   • Peripheral neuropathic pain 04/25/2017   • Major depressive disorder, recurrent episode, in partial remission (CMS-HCC) 12/09/2016   • Shortness of breath 12/09/2016   • Abdominal pain, epigastric 09/22/2016   • Abdominal pain, bilateral lower quadrant 04/19/2016   • Cough 10/16/2015   • Paroxysmal atrial fibrillation (CMS-[facility]) 09/11/2015   • Prosthetic shoulder infection  (CMS-HCC) 09/11/2015   • Macrocytic anemia 09/11/2015   • Diarrhea 08/29/2015   • Hypokalemia 08/29/2015   • Chronic allergic rhinitis 06/02/2015   • Lumbar pain 03/06/2015   • Bilateral hand pain 01/29/2015   • Marijuana use 04/24/2013   • Osteopenia 04/27/2012   • Helicobacter pylori ab+ treated w/ prevpack in june 2011 09/22/2011   • Hypothyroid 04/15/2011   • Hypoxemia requiring supplemental oxygen 07/22/2010   • Anxiety 06/10/2010   • Insomnia 06/10/2010   • Dyslipidemia 06/10/2010   • GERD (gastroesophageal reflux disease) 06/10/2010   • HTN (hypertension)    • Chronic shoulder pain 08/13/2009   • Right hip pain 08/13/2009   • Multinodular goiter 08/13/2009      Social history-single, not working  Allergies:Urea and Tape    Current Outpatient Prescriptions   Medication Sig Dispense Refill   • estradiol (ESTRACE) 1 MG Tab Take 1 Tab by mouth every day. 30 Tab 5   • cefdinir (OMNICEF) 300 MG Cap Take 1 Cap by mouth 2 times a day. 60 Cap 0   • dicyclomine (BENTYL) 10 MG Cap TAKE 1 CAP BY MOUTH 2 TIMES A DAY AS NEEDED. 60 Cap 0   • metoprolol (LOPRESSOR) 25 MG Tab TAKE 1 TABLET BY MOUTH TWICE A DAY 60 Tab 6   • loperamide (IMODIUM) 2 MG Cap TAKE 1 CAP BY MOUTH 4 TIMES A DAY AS NEEDED. DIARRHEA 20 Cap 3   • rivaroxaban (XARELTO) 20 MG Tab tablet Take 1 Tab by mouth with dinner. 30 Tab 0   • simvastatin (ZOCOR) 20 MG Tab TAKE 1 TABLET BY MOUTH EVERY EVENING 30 Tab 6   • ipratropium (ATROVENT) 0.06 % Solution SPRAY 2 SPRAYS IN NOSE 2 TIMES A DAY AS NEEDED. 1 Bottle 6   • gabapentin (NEURONTIN) 600 MG tablet Take 600 mg by mouth 3 times a day.     • vitamin D, Ergocalciferol, (DRISDOL) 77179 UNITS Cap capsule TAKE 1 CAP BY MOUTH EVERY 7 DAYS. 4 Cap 11   • levothyroxine (SYNTHROID) 75 MCG Tab Take 1 Tab by mouth Every morning on an empty stomach. 30 Tab 11   • salmeterol (SEREVENT) 50 MCG/DOSE AEROSOL POWDER, BREATH ACTIVATED Inhale 1 Puff by mouth 2 times a day. 1 Each 11   • tiotropium (SPIRIVA) 18 MCG Cap Inhale 1  "Cap by mouth every day. 30 Cap 3   • Linaclotide 145 MCG Cap Take  by mouth.     • Eszopiclone 3 MG Tab Take 3 mg by mouth at bedtime as needed.     • ranitidine (ZANTAC) 150 MG Tab Take 150 mg by mouth 2 times a day.     • levothyroxine (SYNTHROID) 88 MCG Tab Take 88 mcg by mouth Every morning on an empty stomach.     • diphenhydrAMINE (BENADRYL) 25 MG Tab Take 25 mg by mouth at bedtime as needed for Sleep.     • duloxetine (CYMBALTA) 60 MG Cap DR Particles delayed-release capsule Take 120 mg by mouth every day.     • folic acid (FOLVITE) 1 MG Tab Take 1 Tab by mouth every day. 30 Tab 3   • ramelteon (ROZEREM) 8 MG tablet Take 8 mg by mouth every evening.     • lorazepam (ATIVAN) 1 MG TABS Take 1 mg by mouth 1 time daily as needed.       No current facility-administered medications for this visit.        Social History   Substance Use Topics   • Smoking status: Former Smoker     Packs/day: 1.00     Years: 25.00     Types: Cigarettes     Quit date: 1/1/2008   • Smokeless tobacco: Never Used   • Alcohol use 0.0 oz/week      Comment: 1 or 2 a mo       Family History   Problem Relation Age of Onset   • Heart Disease Mother    • Cancer Father    • Cancer Maternal Aunt    • Arthritis Maternal Grandmother    • Hypertension Maternal Grandmother    • Diabetes Maternal Grandfather    • Heart Disease Maternal Grandfather    • Cancer Paternal Grandfather        ROS:  Review of Systems   Constitutional: Negative for fever, chills, weight loss and malaise/fatigue.   Eyes: Negative for blurred vision.   Respiratory: Negative for cough, sputum production,  and wheezing.    Cardiovascular: Negative for chest pain, palpitations, orthopnea and leg swelling.   Gastrointestinal: Negative for heartburn, nausea, vomiting and abdominal pain.   Endo/Heme/Allergies: Does not bruise/bleed easily.               Exam:  Blood pressure 130/82, pulse 64, temperature 36.3 °C (97.3 °F), resp. rate 16, height 1.549 m (5' 1\"), weight 78 kg (172 lb), " SpO2 97 %.  General:  Well nourished, well developed female in NAD  Head is grossly normal.  Neck: Supple without JVD or bruit. Thyroid is not enlarged. Trachea is midline.  Pulmonary: Clear to ausculation .  Normal effort. No rales, ronchi, or wheezing.  Cardiovascular: Regular rate and rhythm without murmur.  Abdomen-Abdomen is soft, normal bowel sounds, no masses, guarding, ororganomegaly, or tenderness.  Lower extremities- neg for pretibial edema, redness, tenderness.   Right upper extremity-proximately 4 cm of pale green bruising noted midportion of her right upper arm below the retracted chronic extensive scar over the anterior aspect of her right shoulder. Marked chronic restriction of motion of the right shoulder persists    Please note that this dictation was created using voice recognition software. I have made every reasonable attempt to correct obvious errors, but I expect that there are errors of grammar and possibly content that I did not discover before finalizing the note.    Assessment/Plan:  1. Flu vaccine need  INFLUENZA VACCINE, HIGH DOSE (65+ ONLY)   2. Dyspnea, unspecified type  COMP METABOLIC PANEL    CBC WITH DIFFERENTIAL    TSH   3. Hot flashes due to menopause     4. Obesity (BMI 30-39.9)  Patient identified as having weight management issue.  Appropriate orders and counseling given.   5. Major depressive disorder, recurrent episode, in partial remission (CMS-HCC)     6. Shortness of breath        Plan: 1. Flu vaccine today  2. Click CMP, CBC, TSH  3. Cefdinir 300 mg twice a day, #60 written while patient is waiting to have her follow-up with infectious disease, Dr. Adhikari  4. After discussion of potential risks associated with estrogen replacement quickly discontinued beyond 3 years patient would like to try a short-term course of estradiol 1 mg daily (mammogram due January)  5. Revisit one month

## 2017-11-06 ENCOUNTER — TELEPHONE (OUTPATIENT)
Dept: MEDICAL GROUP | Facility: MEDICAL CENTER | Age: 72
End: 2017-11-06

## 2017-11-06 DIAGNOSIS — R06.00 DYSPNEA, UNSPECIFIED TYPE: ICD-10-CM

## 2017-11-07 ENCOUNTER — HOSPITAL ENCOUNTER (OUTPATIENT)
Dept: LAB | Facility: MEDICAL CENTER | Age: 72
End: 2017-11-07
Attending: FAMILY MEDICINE
Payer: MEDICARE

## 2017-11-07 ENCOUNTER — TELEPHONE (OUTPATIENT)
Dept: MEDICAL GROUP | Facility: MEDICAL CENTER | Age: 72
End: 2017-11-07

## 2017-11-07 ENCOUNTER — HOSPITAL ENCOUNTER (OUTPATIENT)
Dept: RADIOLOGY | Facility: MEDICAL CENTER | Age: 72
End: 2017-11-07
Attending: FAMILY MEDICINE
Payer: MEDICARE

## 2017-11-07 DIAGNOSIS — R06.00 DYSPNEA, UNSPECIFIED TYPE: ICD-10-CM

## 2017-11-07 DIAGNOSIS — D75.89 MACROCYTOSIS WITHOUT ANEMIA: ICD-10-CM

## 2017-11-07 LAB
ALBUMIN SERPL BCP-MCNC: 4 G/DL (ref 3.2–4.9)
ALBUMIN/GLOB SERPL: 1.3 G/DL
ALP SERPL-CCNC: 71 U/L (ref 30–99)
ALT SERPL-CCNC: 21 U/L (ref 2–50)
ANION GAP SERPL CALC-SCNC: 10 MMOL/L (ref 0–11.9)
AST SERPL-CCNC: 28 U/L (ref 12–45)
BASOPHILS # BLD AUTO: 0.4 % (ref 0–1.8)
BASOPHILS # BLD: 0.04 K/UL (ref 0–0.12)
BILIRUB SERPL-MCNC: 0.3 MG/DL (ref 0.1–1.5)
BUN SERPL-MCNC: 9 MG/DL (ref 8–22)
CALCIUM SERPL-MCNC: 9 MG/DL (ref 8.5–10.5)
CHLORIDE SERPL-SCNC: 106 MMOL/L (ref 96–112)
CO2 SERPL-SCNC: 19 MMOL/L (ref 20–33)
CREAT SERPL-MCNC: 0.66 MG/DL (ref 0.5–1.4)
EOSINOPHIL # BLD AUTO: 0.25 K/UL (ref 0–0.51)
EOSINOPHIL NFR BLD: 2.6 % (ref 0–6.9)
ERYTHROCYTE [DISTWIDTH] IN BLOOD BY AUTOMATED COUNT: 55.8 FL (ref 35.9–50)
GFR SERPL CREATININE-BSD FRML MDRD: >60 ML/MIN/1.73 M 2
GLOBULIN SER CALC-MCNC: 3.2 G/DL (ref 1.9–3.5)
GLUCOSE SERPL-MCNC: 124 MG/DL (ref 65–99)
HCT VFR BLD AUTO: 41.3 % (ref 37–47)
HGB BLD-MCNC: 13.5 G/DL (ref 12–16)
IMM GRANULOCYTES # BLD AUTO: 0.04 K/UL (ref 0–0.11)
IMM GRANULOCYTES NFR BLD AUTO: 0.4 % (ref 0–0.9)
LYMPHOCYTES # BLD AUTO: 1.8 K/UL (ref 1–4.8)
LYMPHOCYTES NFR BLD: 18.4 % (ref 22–41)
MCH RBC QN AUTO: 36 PG (ref 27–33)
MCHC RBC AUTO-ENTMCNC: 32.7 G/DL (ref 33.6–35)
MCV RBC AUTO: 110.1 FL (ref 81.4–97.8)
MONOCYTES # BLD AUTO: 0.74 K/UL (ref 0–0.85)
MONOCYTES NFR BLD AUTO: 7.6 % (ref 0–13.4)
NEUTROPHILS # BLD AUTO: 6.89 K/UL (ref 2–7.15)
NEUTROPHILS NFR BLD: 70.6 % (ref 44–72)
NRBC # BLD AUTO: 0 K/UL
NRBC BLD AUTO-RTO: 0 /100 WBC
PLATELET # BLD AUTO: 221 K/UL (ref 164–446)
PMV BLD AUTO: 10.7 FL (ref 9–12.9)
POTASSIUM SERPL-SCNC: 3.9 MMOL/L (ref 3.6–5.5)
PROT SERPL-MCNC: 7.2 G/DL (ref 6–8.2)
RBC # BLD AUTO: 3.75 M/UL (ref 4.2–5.4)
SODIUM SERPL-SCNC: 135 MMOL/L (ref 135–145)
TSH SERPL DL<=0.005 MIU/L-ACNC: 3.6 UIU/ML (ref 0.3–3.7)
WBC # BLD AUTO: 9.8 K/UL (ref 4.8–10.8)

## 2017-11-07 PROCEDURE — 71020 DX-CHEST-2 VIEWS: CPT

## 2017-11-07 PROCEDURE — 85025 COMPLETE CBC W/AUTO DIFF WBC: CPT

## 2017-11-07 PROCEDURE — 36415 COLL VENOUS BLD VENIPUNCTURE: CPT

## 2017-11-07 PROCEDURE — 80053 COMPREHEN METABOLIC PANEL: CPT

## 2017-11-07 PROCEDURE — 84443 ASSAY THYROID STIM HORMONE: CPT

## 2017-11-08 ENCOUNTER — TELEPHONE (OUTPATIENT)
Dept: MEDICAL GROUP | Facility: MEDICAL CENTER | Age: 72
End: 2017-11-08

## 2017-11-08 NOTE — TELEPHONE ENCOUNTER
Phone Number Called:[phone] (home) [phone] (work)      Message:Pt informed of the following imaging and x-ray results: Chest x-ray shows unremarkable lungs and heart silhouette. 3 cm hiatal hernia is observed (stomach has slipped up through the diaphragm shelf mildly-no surgery anticipated). Recent labs show normal thyroid level, normal kidney function and liver function. No shortage of red blood cells although the red blood cell individual size is enlarged which we have seen previously and her labs in the past 8 months. Will order a serum copper level just to make sure she does not have a deficiency there. Otherwise  suggest observation and a repeat test within 3-6 months of her CBC     Left Message for patient to call back: N\A

## 2017-11-08 NOTE — TELEPHONE ENCOUNTER
----- Message from Jenaro Treviño M.D. sent at 11/7/2017  6:15 PM PST -----  Chest x-ray shows unremarkable lungs and heart silhouette. 3 cm hiatal hernia is observed (stomach has slipped up through the diaphragm shelf mildly-no surgery anticipated).

## 2017-12-05 ENCOUNTER — OFFICE VISIT (OUTPATIENT)
Dept: MEDICAL GROUP | Facility: MEDICAL CENTER | Age: 72
End: 2017-12-05
Attending: FAMILY MEDICINE
Payer: MEDICARE

## 2017-12-05 VITALS
BODY MASS INDEX: 33.42 KG/M2 | HEIGHT: 61 IN | TEMPERATURE: 97.6 F | HEART RATE: 76 BPM | RESPIRATION RATE: 16 BRPM | SYSTOLIC BLOOD PRESSURE: 120 MMHG | DIASTOLIC BLOOD PRESSURE: 62 MMHG | WEIGHT: 177 LBS | OXYGEN SATURATION: 100 %

## 2017-12-05 DIAGNOSIS — T84.59XD INFECTION OF PROSTHETIC SHOULDER JOINT, SUBSEQUENT ENCOUNTER: ICD-10-CM

## 2017-12-05 DIAGNOSIS — N95.1 HOT FLASHES DUE TO MENOPAUSE: ICD-10-CM

## 2017-12-05 DIAGNOSIS — Z96.619 INFECTION OF PROSTHETIC SHOULDER JOINT, SUBSEQUENT ENCOUNTER: ICD-10-CM

## 2017-12-05 DIAGNOSIS — R06.00 DYSPNEA, UNSPECIFIED TYPE: ICD-10-CM

## 2017-12-05 DIAGNOSIS — M25.511 CHRONIC RIGHT SHOULDER PAIN: Chronic | ICD-10-CM

## 2017-12-05 DIAGNOSIS — G89.29 CHRONIC RIGHT SHOULDER PAIN: Chronic | ICD-10-CM

## 2017-12-05 DIAGNOSIS — F33.41 MAJOR DEPRESSIVE DISORDER, RECURRENT EPISODE, IN PARTIAL REMISSION (HCC): ICD-10-CM

## 2017-12-05 PROCEDURE — G0438 PPPS, INITIAL VISIT: HCPCS | Performed by: FAMILY MEDICINE

## 2017-12-05 PROCEDURE — 99213 OFFICE O/P EST LOW 20 MIN: CPT | Performed by: FAMILY MEDICINE

## 2017-12-05 RX ORDER — LEVOTHYROXINE SODIUM 0.07 MG/1
75 TABLET ORAL
Qty: 30 TAB | Refills: 11 | Status: SHIPPED | OUTPATIENT
Start: 2017-12-05

## 2017-12-05 ASSESSMENT — PAIN SCALES - GENERAL: PAINLEVEL: 8=MODERATE-SEVERE PAIN

## 2017-12-05 ASSESSMENT — PATIENT HEALTH QUESTIONNAIRE - PHQ9: CLINICAL INTERPRETATION OF PHQ2 SCORE: 0

## 2017-12-05 NOTE — PROGRESS NOTES
Chief Complaint   Patient presents with   • Follow-Up   Date: What was approximately as is both on mucosa dry      HPI:  Ora Martinez is a 72 y.o. here for Medicare Annual Wellness Visit     Patient Active Problem List    Diagnosis Date Noted   • Hot flashes due to menopause 11/02/2017   • Fatigue 04/25/2017   • Peripheral neuropathic pain 04/25/2017   • Major depressive disorder, recurrent episode, in partial remission (CMS-HCC) 12/09/2016   • Shortness of breath 12/09/2016   • Abdominal pain, epigastric 09/22/2016   • Abdominal pain, bilateral lower quadrant 04/19/2016   • Cough 10/16/2015   • Paroxysmal atrial fibrillation (CMS-HCC) 09/11/2015   • Prosthetic shoulder infection (CMS-HCC) 09/11/2015   • Macrocytic anemia 09/11/2015   • Diarrhea 08/29/2015   • Hypokalemia 08/29/2015   • Chronic allergic rhinitis 06/02/2015   • Lumbar pain 03/06/2015   • Bilateral hand pain 01/29/2015   • Marijuana use 04/24/2013   • Osteopenia 04/27/2012   • Helicobacter pylori ab+ treated w/ prevpack in june 2011 09/22/2011   • Hypothyroid 04/15/2011   • Hypoxemia requiring supplemental oxygen 07/22/2010   • Anxiety 06/10/2010   • Insomnia 06/10/2010   • Dyslipidemia 06/10/2010   • GERD (gastroesophageal reflux disease) 06/10/2010   • HTN (hypertension)    • Chronic shoulder pain 08/13/2009   • Right hip pain 08/13/2009   • Multinodular goiter 08/13/2009       Current Outpatient Prescriptions   Medication Sig Dispense Refill   • rivaroxaban (XARELTO) 20 MG Tab tablet Take 1 Tab by mouth with dinner. 30 Tab 2   • loperamide (IMODIUM) 2 MG Cap TAKE 1 CAP BY MOUTH 4 TIMES A DAY AS NEEDED. DIARRHEA 20 Cap 6   • dicyclomine (BENTYL) 10 MG Cap TAKE 1 CAP BY MOUTH 2 TIMES A DAY AS NEEDED. 60 Cap 6   • vitamin D, Ergocalciferol, (DRISDOL) 50407 units Cap capsule TAKE 1 CAP BY MOUTH EVERY 7 DAYS. 4 Cap 11   • estradiol (ESTRACE) 1 MG Tab Take 1 Tab by mouth every day. 30 Tab 5   • cefdinir (OMNICEF) 300 MG Cap Take 1 Cap by mouth 2  times a day. 60 Cap 0   • metoprolol (LOPRESSOR) 25 MG Tab TAKE 1 TABLET BY MOUTH TWICE A DAY 60 Tab 6   • simvastatin (ZOCOR) 20 MG Tab TAKE 1 TABLET BY MOUTH EVERY EVENING 30 Tab 6   • ipratropium (ATROVENT) 0.06 % Solution SPRAY 2 SPRAYS IN NOSE 2 TIMES A DAY AS NEEDED. 1 Bottle 6   • gabapentin (NEURONTIN) 600 MG tablet Take 600 mg by mouth 3 times a day.     • levothyroxine (SYNTHROID) 75 MCG Tab Take 1 Tab by mouth Every morning on an empty stomach. 30 Tab 11   • salmeterol (SEREVENT) 50 MCG/DOSE AEROSOL POWDER, BREATH ACTIVATED Inhale 1 Puff by mouth 2 times a day. 1 Each 11   • tiotropium (SPIRIVA) 18 MCG Cap Inhale 1 Cap by mouth every day. 30 Cap 3   • Linaclotide 145 MCG Cap Take  by mouth.     • Eszopiclone 3 MG Tab Take 3 mg by mouth at bedtime as needed.     • ranitidine (ZANTAC) 150 MG Tab Take 150 mg by mouth 2 times a day.     • levothyroxine (SYNTHROID) 88 MCG Tab Take 88 mcg by mouth Every morning on an empty stomach.     • diphenhydrAMINE (BENADRYL) 25 MG Tab Take 25 mg by mouth at bedtime as needed for Sleep.     • duloxetine (CYMBALTA) 60 MG Cap DR Particles delayed-release capsule Take 120 mg by mouth every day.     • folic acid (FOLVITE) 1 MG Tab Take 1 Tab by mouth every day. 30 Tab 3   • ramelteon (ROZEREM) 8 MG tablet Take 8 mg by mouth every evening.     • lorazepam (ATIVAN) 1 MG TABS Take 1 mg by mouth 1 time daily as needed.       No current facility-administered medications for this visit.             Current supplements as per medication list.       Allergies: Urea and Tape    Current social contact/activities: only family    She  reports that she quit smoking about 9 years ago. Her smoking use included Cigarettes. She has a 25.00 pack-year smoking history. She has never used smokeless tobacco. She reports that she drinks alcohol. She reports that she uses drugs.  Counseling given: Not Answered        DPA/Advanced Directive:  Patient has Durable Power of  on file.       ROS:     Gait: Uses no assistive device   Ostomy: no   Other tubes: no   Amputations: no   Chronic oxygen use: no   Last eye exam:2017   : Reports urinary leakage during the last 6 months that has not interfered at all with their daily activities or sleep. incontinence.       Screenin  Depression Screening    Little interest or pleasure in doing things?  0 - not at all  Feeling down, depressed , or hopeless? 0 - not at all  Patient Health Questionnaire Score: 0     If depressive symptoms identified deferred to follow up visit unless specifically addressed in assessment and plan.    Interpretation of PHQ-9 Total Score   Score Severity   1-4 No Depression   5-9 Mild Depression   10-14 Moderate Depression   15-19 Moderately Severe Depression   20-27 Severe Depression    Screening for Cognitive Impairment    Three Minute Recall (apple, watch, chanel)  3/3    Draw clock face with all 12 numbers set to the hand to show 10 minutes past 11 o'clock  1    Cognitive concerns identified deferred for follow up unless specifically addressed in assessment and plan.    Fall Risk Assessment    Has the patient had two or more falls in the last year or any fall with injury in the last year?  No    Safety Assessment    Throw rugs on floor.  Yes  Handrails on all stairs.  No  Good lighting in all hallways.  Yes  Difficulty hearing.  Yes  Patient counseled about all safety risks that were identified.    Functional Assessment ADLs    Are there any barriers preventing you from cooking for yourself or meeting nutritional needs?  No.    Are there any barriers preventing you from driving safely or obtaining transportation?  No.    Are there any barriers preventing you from using a telephone or calling for help?  No.    Are there any barriers preventing you from shopping?  Yes.    Are there any barriers preventing you from taking care of your own finances?  No.    Are there any barriers preventing you from managing your medications?   No.    Are currently engaging any exercise or physical activity?  No.       Health Maintenance Summary                Annual Wellness Visit Overdue 1945     IMM DTaP/Tdap/Td Vaccine Overdue 11/5/1966     IMM ZOSTER VACCINE Overdue 11/5/2007     IMM PNEUMOCOCCAL 65+ (ADULT) LOW/MEDIUM RISK SERIES Overdue 9/1/2016      Done 9/1/2015 Imm Admin: Pneumococcal Conjugate Vaccine (Prevnar/PCV-13)    MAMMOGRAM Next Due 2/14/2018      Done 2/14/2017 MA-MAMMO SCREENING BILAT W/TOMOSYNTHESIS W/CAD     Patient has more history with this topic...    BONE DENSITY Next Due 9/30/2019      Done 9/30/2014 DS-BONE DENSITY STUDY (DEXA)     Patient has more history with this topic...    COLONOSCOPY Next Due 7/18/2022      Done 7/18/2017 REFERRAL TO GI FOR COLONOSCOPY          Patient Care Team:  Jenaro Treviño M.D. as PCP - General (Family Medicine)      Social History   Substance Use Topics   • Smoking status: Former Smoker     Packs/day: 1.00     Years: 25.00     Types: Cigarettes     Quit date: 1/1/2008   • Smokeless tobacco: Never Used   • Alcohol use 0.0 oz/week      Comment: 1 or 2 a mo     Family History   Problem Relation Age of Onset   • Heart Disease Mother    • Cancer Father    • Cancer Maternal Aunt    • Arthritis Maternal Grandmother    • Hypertension Maternal Grandmother    • Diabetes Maternal Grandfather    • Heart Disease Maternal Grandfather    • Cancer Paternal Grandfather      She  has a past medical history of Arthritis; ASTHMA; Breath shortness; Bronchitis; CATARACT; Chronic LBP; Chronic shoulder pain; Cold; Constipation (8/13/2009); Dental disorder; Dyslipidemia; EMPHYSEMA; Fall; Fibroadenoma nos; Genital warts; GERD (gastroesophageal reflux disease); Heart burn; Hiatal hernia; Hip pain; HTN (hypertension); HTN (hypertension); Indigestion; Multinodular goiter; Other and unspecified hyperlipidemia; Other specified disorder of intestines; Other specified symptom associated with female genital organs; Pain;  "Pneumonia; Psychiatric problem; Pulmonary hypertension; Right hip pain (8/13/2009); Scoliosis; Snoring; Unspecified disorder of thyroid; Urinary bladder disorder; and Vitamin D deficiency. She also has no past medical history of Breast cancer (CMS-HCC).   Past Surgical History:   Procedure Laterality Date   • VULVECTOMY PARTIAL  1/30/2014    Performed by Celso Harley M.D. at SURGERY [facility]   • WIDE EXCISION  1/30/2014    Performed by Celso Harley M.D. at SURGERY [facility]   • THYROIDECTOMY TOTAL  12/14/2011    Performed by [place] ONOFRE at SURGERY SAME DAY [facility]   • GASTROSCOPY-ENDO  6/22/2011    Performed by DENICE KENDRICK at ENDOSCOPY [facility] ORS   • OTHER ABDOMINAL SURGERY  2007    andrei   • OPEN REDUCTION  1994    Right humerus   • ABDOMINAL HYSTERECTOMY TOTAL  1983    Endometriosis   • TONSILLECTOMY AND ADENOIDECTOMY  1973       Exam:     Blood pressure 120/62, pulse 76, temperature 36.4 °C (97.6 °F), resp. rate 16, height 1.549 m (5' 1\"), weight 80.3 kg (177 lb), SpO2 100 %. Body mass index is 33.44 kg/m².    Hearing fair.    Dentition upper and lower dentures  Alert, oriented in no acute distress.  Eye contact is good, speech goal directed, affect calm      Assessment and Plan. The following treatment and monitoring plan is recommended:    1.Immunization- TDAP today  2.Chronic Pain- continue with Dr Sims  3. Estrogen deficiency-on replacement  4. Chronic right shoulder prosthesis infection-continue follow-up with Dr. Spence  Services suggested: none  Health Care Screening: Age-appropriate preventive services Medicare covers discussed today and ordered if indicated.  Referrals offered: Community-based lifestyle interventions to reduce health risks and promote self-management and wellness, fall prevention, nutrition, physical activity, tobacco-use cessation, weight loss, and mental health services as per orders if indicated.    Discussion today about general wellness and " lifestyle habits:    · Prevent falls and reduce trip hazards; Cautioned about securing or removing rugs.  · Have a working fire alarm and carbon monoxide detector;   · Engage in regular physical activity and social activities       Follow-up:One month-will need updated mammogram at that time follow-up on dyspnea

## 2017-12-06 ENCOUNTER — HOSPITAL ENCOUNTER (OUTPATIENT)
Dept: RADIOLOGY | Facility: MEDICAL CENTER | Age: 72
End: 2017-12-06
Attending: FAMILY MEDICINE
Payer: MEDICARE

## 2017-12-06 DIAGNOSIS — R06.00 DYSPNEA, UNSPECIFIED TYPE: ICD-10-CM

## 2017-12-06 PROCEDURE — 71020 DX-CHEST-2 VIEWS: CPT

## 2017-12-15 ENCOUNTER — TELEPHONE (OUTPATIENT)
Dept: MEDICAL GROUP | Facility: MEDICAL CENTER | Age: 72
End: 2017-12-15

## 2017-12-15 NOTE — TELEPHONE ENCOUNTER
----- Message from Jenaro Treviño M.D. sent at 12/11/2017  7:54 AM PST -----  Chest x-ray shows no active disease

## 2017-12-15 NOTE — TELEPHONE ENCOUNTER
1. Caller Name: Ora                                       Call Back Number: [phone] (home) [phone] (work)        Patient approves a detailed voicemail message: yes    2. What are the patient's symptoms (location & severity)? Cough, sob    3. Is this a new symptom Yes    4. When did it start? 2 days ago    5. Action taken per Active Symptom Guide: Pt was seen in office 10 days ago. She is requesting antibiotics as she has not gotten any better.     6. Patient agrees to recommended action per active symptom guide

## 2017-12-15 NOTE — TELEPHONE ENCOUNTER
Phone Number Called: [phone] (home) [phone] (work)      Message:Pt informed of the following results: Chest x-ray shows no active disease     Left Message for patient to call back: N\A

## 2017-12-16 NOTE — TELEPHONE ENCOUNTER
When she was seen 10 days ago. It was for her annual wellness H RA, sickness or cough was not addressed. She needs to schedule an appointment, and see her PCP for further evaluation of need of antibiotics

## 2017-12-18 ENCOUNTER — TELEPHONE (OUTPATIENT)
Dept: MEDICAL GROUP | Facility: MEDICAL CENTER | Age: 72
End: 2017-12-18

## 2017-12-22 ENCOUNTER — TELEPHONE (OUTPATIENT)
Dept: MEDICAL GROUP | Facility: MEDICAL CENTER | Age: 72
End: 2017-12-22

## 2017-12-22 RX ORDER — CEPHALEXIN 500 MG/1
500 CAPSULE ORAL 3 TIMES DAILY
Qty: 21 CAP | Refills: 0 | Status: SHIPPED | OUTPATIENT
Start: 2017-12-22

## 2017-12-22 RX ORDER — ALBUTEROL SULFATE 90 UG/1
2 AEROSOL, METERED RESPIRATORY (INHALATION) EVERY 6 HOURS PRN
Qty: 8.5 G | Refills: 3 | Status: SHIPPED | OUTPATIENT
Start: 2017-12-22

## 2017-12-22 NOTE — TELEPHONE ENCOUNTER
1. Caller Name: Ora                                         Call Back Number: [phone] (home) [phone] (work)        Patient approves a detailed voicemail message: yes    2. What are the patient's symptoms (location & severity)? Cough, sinus and chest congestion     3. Is this a new symptom Yes    4. When did it start? About 6 days ago    5. Action taken per Active Symptom Guide: Pt called stating that she is very sick and would like an antibiotic and an inhaler called into her pharmacy to help with her illness. She is in [place] caring for her mother and does not want to pass it on. She will make an appointment in a few weeks. Please advise    6. Patient agrees to recommended action per active symptom guide

## 2018-01-16 ENCOUNTER — TELEPHONE (OUTPATIENT)
Dept: VASCULAR LAB | Facility: MEDICAL CENTER | Age: 73
End: 2018-01-16

## 2018-01-16 NOTE — TELEPHONE ENCOUNTER
[facility]    Pt does not like to be seen in clinic.  LM for pt to contact the clinic for any s/s of bleeding or medication changes in the past 6 months.  Renal function CrCl >80 mL/min.  Continue with current Xarelto dose.    David Herring, PharmD

## 2018-02-01 RX ORDER — RIVAROXABAN 20 MG/1
TABLET, FILM COATED ORAL
Qty: 30 TAB | Refills: 1 | Status: SHIPPED | OUTPATIENT
Start: 2018-02-01 | End: 2018-04-29 | Stop reason: SDUPTHER

## 2018-02-09 RX ORDER — IPRATROPIUM BROMIDE 42 UG/1
SPRAY, METERED NASAL
Qty: 1 BOTTLE | Refills: 2 | Status: SHIPPED | OUTPATIENT
Start: 2018-02-09 | End: 2018-05-29 | Stop reason: SDUPTHER

## 2018-03-05 ENCOUNTER — HOSPITAL ENCOUNTER (OUTPATIENT)
Dept: LAB | Facility: MEDICAL CENTER | Age: 73
End: 2018-03-05
Attending: FAMILY MEDICINE
Payer: MEDICARE

## 2018-03-05 ENCOUNTER — OFFICE VISIT (OUTPATIENT)
Dept: MEDICAL GROUP | Facility: MEDICAL CENTER | Age: 73
End: 2018-03-05
Attending: FAMILY MEDICINE
Payer: MEDICARE

## 2018-03-05 VITALS
HEIGHT: 61 IN | WEIGHT: 174 LBS | HEART RATE: 68 BPM | TEMPERATURE: 97.6 F | OXYGEN SATURATION: 99 % | SYSTOLIC BLOOD PRESSURE: 142 MMHG | DIASTOLIC BLOOD PRESSURE: 78 MMHG | BODY MASS INDEX: 32.85 KG/M2 | RESPIRATION RATE: 16 BRPM

## 2018-03-05 DIAGNOSIS — F41.9 ANXIETY: ICD-10-CM

## 2018-03-05 DIAGNOSIS — R22.9 MULTIPLE SKIN NODULES: ICD-10-CM

## 2018-03-05 DIAGNOSIS — R53.83 FATIGUE, UNSPECIFIED TYPE: ICD-10-CM

## 2018-03-05 DIAGNOSIS — N64.4 BREAST PAIN: ICD-10-CM

## 2018-03-05 LAB
ALBUMIN SERPL BCP-MCNC: 4 G/DL (ref 3.2–4.9)
ALBUMIN/GLOB SERPL: 1.4 G/DL
ALP SERPL-CCNC: 59 U/L (ref 30–99)
ALT SERPL-CCNC: 17 U/L (ref 2–50)
ANION GAP SERPL CALC-SCNC: 6 MMOL/L (ref 0–11.9)
AST SERPL-CCNC: 23 U/L (ref 12–45)
BASOPHILS # BLD AUTO: 0.6 % (ref 0–1.8)
BASOPHILS # BLD: 0.06 K/UL (ref 0–0.12)
BILIRUB SERPL-MCNC: 0.3 MG/DL (ref 0.1–1.5)
BUN SERPL-MCNC: 14 MG/DL (ref 8–22)
CALCIUM SERPL-MCNC: 8.3 MG/DL (ref 8.5–10.5)
CHLORIDE SERPL-SCNC: 104 MMOL/L (ref 96–112)
CO2 SERPL-SCNC: 25 MMOL/L (ref 20–33)
CREAT SERPL-MCNC: 0.86 MG/DL (ref 0.5–1.4)
EOSINOPHIL # BLD AUTO: 0.78 K/UL (ref 0–0.51)
EOSINOPHIL NFR BLD: 7.4 % (ref 0–6.9)
ERYTHROCYTE [DISTWIDTH] IN BLOOD BY AUTOMATED COUNT: 55.9 FL (ref 35.9–50)
EST. AVERAGE GLUCOSE BLD GHB EST-MCNC: 134 MG/DL
GLOBULIN SER CALC-MCNC: 2.9 G/DL (ref 1.9–3.5)
GLUCOSE SERPL-MCNC: 108 MG/DL (ref 65–99)
HBA1C MFR BLD: 6.3 % (ref 0–5.6)
HCT VFR BLD AUTO: 35.8 % (ref 37–47)
HGB BLD-MCNC: 11.3 G/DL (ref 12–16)
IMM GRANULOCYTES # BLD AUTO: 0.03 K/UL (ref 0–0.11)
IMM GRANULOCYTES NFR BLD AUTO: 0.3 % (ref 0–0.9)
LYMPHOCYTES # BLD AUTO: 2.6 K/UL (ref 1–4.8)
LYMPHOCYTES NFR BLD: 24.6 % (ref 22–41)
MCH RBC QN AUTO: 33.8 PG (ref 27–33)
MCHC RBC AUTO-ENTMCNC: 31.6 G/DL (ref 33.6–35)
MCV RBC AUTO: 107.2 FL (ref 81.4–97.8)
MONOCYTES # BLD AUTO: 0.96 K/UL (ref 0–0.85)
MONOCYTES NFR BLD AUTO: 9.1 % (ref 0–13.4)
NEUTROPHILS # BLD AUTO: 6.13 K/UL (ref 2–7.15)
NEUTROPHILS NFR BLD: 58 % (ref 44–72)
NRBC # BLD AUTO: 0 K/UL
NRBC BLD-RTO: 0 /100 WBC
PLATELET # BLD AUTO: 295 K/UL (ref 164–446)
PMV BLD AUTO: 11.3 FL (ref 9–12.9)
POTASSIUM SERPL-SCNC: 4 MMOL/L (ref 3.6–5.5)
PROT SERPL-MCNC: 6.9 G/DL (ref 6–8.2)
RBC # BLD AUTO: 3.34 M/UL (ref 4.2–5.4)
SODIUM SERPL-SCNC: 135 MMOL/L (ref 135–145)
VIT B12 SERPL-MCNC: 412 PG/ML (ref 211–911)
WBC # BLD AUTO: 10.6 K/UL (ref 4.8–10.8)

## 2018-03-05 PROCEDURE — 36415 COLL VENOUS BLD VENIPUNCTURE: CPT

## 2018-03-05 PROCEDURE — 82607 VITAMIN B-12: CPT

## 2018-03-05 PROCEDURE — 99213 OFFICE O/P EST LOW 20 MIN: CPT | Performed by: FAMILY MEDICINE

## 2018-03-05 PROCEDURE — 99214 OFFICE O/P EST MOD 30 MIN: CPT | Performed by: FAMILY MEDICINE

## 2018-03-05 PROCEDURE — 85025 COMPLETE CBC W/AUTO DIFF WBC: CPT

## 2018-03-05 PROCEDURE — 83036 HEMOGLOBIN GLYCOSYLATED A1C: CPT | Mod: GA

## 2018-03-05 PROCEDURE — 80053 COMPREHEN METABOLIC PANEL: CPT

## 2018-03-05 RX ORDER — LORAZEPAM 1 MG/1
1 TABLET ORAL
Qty: 30 TAB | Refills: 0 | Status: SHIPPED | OUTPATIENT
Start: 2018-03-05 | End: 2018-04-04

## 2018-03-05 ASSESSMENT — PAIN SCALES - GENERAL: PAINLEVEL: 3=SLIGHT PAIN

## 2018-03-06 ENCOUNTER — TELEPHONE (OUTPATIENT)
Dept: MEDICAL GROUP | Facility: MEDICAL CENTER | Age: 73
End: 2018-03-06

## 2018-03-06 DIAGNOSIS — R53.83 FATIGUE, UNSPECIFIED TYPE: ICD-10-CM

## 2018-03-06 DIAGNOSIS — D53.9 MACROCYTIC ANEMIA: ICD-10-CM

## 2018-03-06 PROBLEM — R22.9 MULTIPLE SKIN NODULES: Status: RESOLVED | Noted: 2018-03-05 | Resolved: 2018-03-06

## 2018-03-06 NOTE — ASSESSMENT & PLAN NOTE
Patient reports worsening sense of daily anxiety as she is coping with her mother. Patient lives with her mother, mom has worsening dementia and worsening loss of hearing creating major communication difficulties. Patient lives with her 50-year-old son and does have part-time assistance at her home 7 days a week. She is seeing a counselor every 2 weeks at [place] and intermittently sees Dr. Noonan. She reports that she takes lorazepam typically one half of a 1 mg tablet daily in the evening to settle down. She is not taking any opiates.

## 2018-03-06 NOTE — PROGRESS NOTES
Chief Complaint   Patient presents with   • Shoulder Pain       HISTORY OF PRESENT ILLNESS: Patient is a 72 y.o. female established patient who presents today to follow-up on anxiety, persistent fatigue, recent lateral left breast pain, scalp nodules        Anxiety  Patient reports worsening sense of daily anxiety as she is coping with her mother. Patient lives with her mother, mom has worsening dementia and worsening loss of hearing creating major communication difficulties. Patient lives with her 50-year-old son and does have part-time assistance at her home 7 days a week. She is seeing a counselor every 2 weeks at [facility] and intermittently sees Dr. Noonan. She reports that she takes lorazepam typically one half of a 1 mg tablet daily in the evening to settle down. She is not taking any opiates.    Fatigue  Patient notes fluctuating levels of fatigue with gradual worsening in the past several months. Labs done in April including thyroid were unremarkable at that time. Patient reports that she sleeps between 6-8 hours per night. As noted she is dealing with anxiety, denies tearful depression.    Breast pain  Patient reports a recent tenderness in the lateral portion of her left breast over the past 2 weeks or so. She reports in the past several days that tenderness has subsequently cleared. She also reports that her nipple was becoming inverted 2-3 weeks ago and now has normalized its appearance. There has been no bleeding or other discharge from either breast. Last mammogram was over one year ago. Patient reports she had a benign biopsy from the left breast in 2000    Multiple skin nodules  Patient notes pruritic small nodules in her scalp near the vertex area over the past 1 month. She has not noticed any rashes elsewhere on her skin. She denies severe dandruff or headache.   Social history-single, not working, caring for her elderly mother    Patient Active Problem List    Diagnosis Date Noted   • Breast pain  03/05/2018   • Multiple skin nodules 03/05/2018   • Hot flashes due to menopause 11/02/2017   • Fatigue 04/25/2017   • Peripheral neuropathic pain 04/25/2017   • Major depressive disorder, recurrent episode, in partial remission (CMS-HCC) 12/09/2016   • Shortness of breath 12/09/2016   • Abdominal pain, epigastric 09/22/2016   • Abdominal pain, bilateral lower quadrant 04/19/2016   • Cough 10/16/2015   • Paroxysmal atrial fibrillation (CMS-HCC) 09/11/2015   • Prosthetic shoulder infection (CMS-HCC) 09/11/2015   • Macrocytic anemia 09/11/2015   • Diarrhea 08/29/2015   • Hypokalemia 08/29/2015   • Chronic allergic rhinitis 06/02/2015   • Lumbar pain 03/06/2015   • Bilateral hand pain 01/29/2015   • Marijuana use 04/24/2013   • Osteopenia 04/27/2012   • Helicobacter pylori ab+ treated w/ prevpack in june 2011 09/22/2011   • Hypothyroid 04/15/2011   • Hypoxemia requiring supplemental oxygen 07/22/2010   • Anxiety 06/10/2010   • Insomnia 06/10/2010   • Dyslipidemia 06/10/2010   • GERD (gastroesophageal reflux disease) 06/10/2010   • HTN (hypertension)    • Chronic shoulder pain 08/13/2009   • Right hip pain 08/13/2009   • Multinodular goiter 08/13/2009       Allergies:Urea and Tape    Current Outpatient Prescriptions   Medication Sig Dispense Refill   • LORazepam (ATIVAN) 1 MG Tab Take 1 Tab by mouth 1 time daily as needed for up to 30 days. 30 Tab 0   • simvastatin (ZOCOR) 20 MG Tab TAKE 1 TABLET BY MOUTH EVERY EVENING 30 Tab 11   • ipratropium (ATROVENT) 0.06 % Solution SPRAY 2 SPRAYS IN NOSE 2 TIMES A DAY AS NEEDED. 1 Bottle 2   • XARELTO 20 MG Tab tablet TAKE 1 TABLET BY MOUTH EVERY DAY WITH DINNER 30 Tab 1   • cephALEXin (KEFLEX) 500 MG Cap Take 1 Cap by mouth 3 times a day. 21 Cap 0   • albuterol 108 (90 Base) MCG/ACT Aero Soln inhalation aerosol Inhale 2 Puffs by mouth every 6 hours as needed for Shortness of Breath. 8.5 g 3   • levothyroxine (SYNTHROID) 75 MCG Tab Take 1 Tab by mouth Every morning on an empty  stomach. 30 Tab 11   • loperamide (IMODIUM) 2 MG Cap TAKE 1 CAP BY MOUTH 4 TIMES A DAY AS NEEDED. DIARRHEA 20 Cap 6   • dicyclomine (BENTYL) 10 MG Cap TAKE 1 CAP BY MOUTH 2 TIMES A DAY AS NEEDED. 60 Cap 6   • vitamin D, Ergocalciferol, (DRISDOL) 53726 units Cap capsule TAKE 1 CAP BY MOUTH EVERY 7 DAYS. 4 Cap 11   • estradiol (ESTRACE) 1 MG Tab Take 1 Tab by mouth every day. 30 Tab 5   • metoprolol (LOPRESSOR) 25 MG Tab TAKE 1 TABLET BY MOUTH TWICE A DAY 60 Tab 6   • gabapentin (NEURONTIN) 600 MG tablet Take 600 mg by mouth 3 times a day.     • salmeterol (SEREVENT) 50 MCG/DOSE AEROSOL POWDER, BREATH ACTIVATED Inhale 1 Puff by mouth 2 times a day. 1 Each 11   • tiotropium (SPIRIVA) 18 MCG Cap Inhale 1 Cap by mouth every day. 30 Cap 3   • Linaclotide 145 MCG Cap Take  by mouth.     • Eszopiclone 3 MG Tab Take 3 mg by mouth at bedtime as needed.     • ranitidine (ZANTAC) 150 MG Tab Take 150 mg by mouth 2 times a day.     • levothyroxine (SYNTHROID) 88 MCG Tab Take 88 mcg by mouth Every morning on an empty stomach.     • diphenhydrAMINE (BENADRYL) 25 MG Tab Take 25 mg by mouth at bedtime as needed for Sleep.     • duloxetine (CYMBALTA) 60 MG Cap DR Particles delayed-release capsule Take 120 mg by mouth every day.     • folic acid (FOLVITE) 1 MG Tab Take 1 Tab by mouth every day. 30 Tab 3   • ramelteon (ROZEREM) 8 MG tablet Take 8 mg by mouth every evening.       No current facility-administered medications for this visit.        Social History   Substance Use Topics   • Smoking status: Former Smoker     Packs/day: 1.00     Years: 25.00     Types: Cigarettes     Quit date: 1/1/2008   • Smokeless tobacco: Never Used   • Alcohol use 0.0 oz/week      Comment: 1 or 2 a mo       Family History   Problem Relation Age of Onset   • Heart Disease Mother    • Cancer Father    • Cancer Maternal Aunt    • Arthritis Maternal Grandmother    • Hypertension Maternal Grandmother    • Diabetes Maternal Grandfather    • Heart Disease  "Maternal Grandfather    • Cancer Paternal Grandfather        ROS:  Review of Systems   Constitutional: Negative for fever, chills, weight loss and malaise/fatigue.   Eyes: Negative for blurred vision.   Respiratory: Negative for cough, sputum production, shortness of breath and wheezing.    Cardiovascular: Negative for chest pain, palpitations, orthopnea and leg swelling.   Gastrointestinal: Negative for heartburn, nausea, vomiting and abdominal pain. .   Endo/Heme/Allergies: Does not bruise/bleed easily.               Exam:  Blood pressure 142/78, pulse 68, temperature 36.4 °C (97.6 °F), resp. rate 16, height 1.549 m (5' 0.98\"), weight 78.9 kg (174 lb), SpO2 99 %, not currently breastfeeding.  General:  Well nourished, well developed female in NAD  Head is grossly normal.  Neck: Supple without JVD or bruit. Thyroid is not enlarged. Trachea is midline.  Pulmonary: Clear to ausculation .  Normal effort. No rales, ronchi, or wheezing.  Cardiovascular: Regular rate and rhythm without murmur.  Abdomen-Abdomen is soft, normal bowel sounds, no masses, guarding, ororganomegaly, or tenderness.  Lower extremities- neg for pretibial edema, redness, tenderness.  Skin-few 3-4 mm light colored papules without specific pigmentation palpable near the vertex area of the scalp. No areas of hair loss, scalp edema, scale    Please note that this dictation was created using voice recognition software. I have made every reasonable attempt to correct obvious errors, but I expect that there are errors of grammar and possibly content that I did not discover before finalizing the note.    Assessment/Plan:  1. Anxiety  LORazepam (ATIVAN) 1 MG Tab   2. Breast pain  MA-DIAGNOSTIC MAMMO W/O CAD-BILAT   3. Multiple skin nodules     4. Fatigue, unspecified type  CBC WITH DIFFERENTIAL    COMP METABOLIC PANEL    HEMOGLOBIN A1C    VITAMIN B12      Plan: 1. Update CBC, CMP, A1c and vitamin B-12 level  2. Diagnostic mammogram   3. Rx lorazepam 1 mg, " #30 until patient reestablishes with her psychiatrist in April

## 2018-03-06 NOTE — ASSESSMENT & PLAN NOTE
Patient notes fluctuating levels of fatigue with gradual worsening in the past several months. Labs done in April including thyroid were unremarkable at that time. Patient reports that she sleeps between 6-8 hours per night. As noted she is dealing with anxiety, denies tearful depression.

## 2018-03-06 NOTE — ASSESSMENT & PLAN NOTE
Patient notes pruritic small nodules in her scalp near the vertex area over the past 1 month. She has not noticed any rashes elsewhere on her skin. She denies severe dandruff or headache.

## 2018-03-06 NOTE — ASSESSMENT & PLAN NOTE
Patient reports a recent tenderness in the lateral portion of her left breast over the past 2 weeks or so. She reports in the past several days that tenderness has subsequently cleared. She also reports that her nipple was becoming inverted 2-3 weeks ago and now has normalized its appearance. There has been no bleeding or other discharge from either breast. Last mammogram was over one year ago. Patient reports she had a benign biopsy from the left breast in 2000

## 2018-03-06 NOTE — TELEPHONE ENCOUNTER
----- Message from Jenaro Treviño M.D. sent at 3/6/2018  7:43 AM PST -----  Labs show elevated A1c but still not rising to the level of official diabetes (prediabetes elevated fasting sugar). B-12 level is normal. Anemia has developed it is mild but does show features of large red blood cells will have her check a folate which I have ordered as well. B-12 with the other usual deficiency that we just check that and it is normal. Calcium is also mildly decreased and suggest she take a calcium supplement such as 500 mg of calcium with 500 units of vitamin D.

## 2018-03-29 ENCOUNTER — HOSPITAL ENCOUNTER (OUTPATIENT)
Dept: LAB | Facility: MEDICAL CENTER | Age: 73
End: 2018-03-29
Attending: FAMILY MEDICINE
Payer: MEDICARE

## 2018-03-29 DIAGNOSIS — D75.89 MACROCYTOSIS WITHOUT ANEMIA: ICD-10-CM

## 2018-03-29 DIAGNOSIS — R53.83 FATIGUE, UNSPECIFIED TYPE: ICD-10-CM

## 2018-03-29 DIAGNOSIS — D53.9 MACROCYTIC ANEMIA: ICD-10-CM

## 2018-03-29 LAB
FOLATE SERPL-MCNC: >23.5 NG/ML
TSH SERPL DL<=0.005 MIU/L-ACNC: 3.07 UIU/ML (ref 0.38–5.33)
VIT B12 SERPL-MCNC: 406 PG/ML (ref 211–911)

## 2018-03-29 PROCEDURE — 82607 VITAMIN B-12: CPT

## 2018-03-29 PROCEDURE — 36415 COLL VENOUS BLD VENIPUNCTURE: CPT

## 2018-03-29 PROCEDURE — 84443 ASSAY THYROID STIM HORMONE: CPT

## 2018-03-29 PROCEDURE — 82746 ASSAY OF FOLIC ACID SERUM: CPT

## 2018-03-29 PROCEDURE — 82525 ASSAY OF COPPER: CPT

## 2018-04-03 LAB — COPPER SERPL-MCNC: 160 UG/DL (ref 80–155)

## 2018-04-09 ENCOUNTER — TELEPHONE (OUTPATIENT)
Dept: RADIOLOGY | Facility: MEDICAL CENTER | Age: 73
End: 2018-04-09

## 2018-04-10 ENCOUNTER — OFFICE VISIT (OUTPATIENT)
Dept: MEDICAL GROUP | Facility: MEDICAL CENTER | Age: 73
End: 2018-04-10
Attending: FAMILY MEDICINE
Payer: MEDICARE

## 2018-04-10 ENCOUNTER — HOSPITAL ENCOUNTER (OUTPATIENT)
Dept: RADIOLOGY | Facility: MEDICAL CENTER | Age: 73
End: 2018-04-10
Attending: FAMILY MEDICINE
Payer: MEDICARE

## 2018-04-10 VITALS
HEIGHT: 61 IN | OXYGEN SATURATION: 96 % | BODY MASS INDEX: 31.34 KG/M2 | WEIGHT: 166 LBS | HEART RATE: 68 BPM | RESPIRATION RATE: 16 BRPM | DIASTOLIC BLOOD PRESSURE: 78 MMHG | TEMPERATURE: 97 F | SYSTOLIC BLOOD PRESSURE: 140 MMHG

## 2018-04-10 DIAGNOSIS — N64.4 BREAST PAIN: ICD-10-CM

## 2018-04-10 DIAGNOSIS — F41.9 ANXIETY: ICD-10-CM

## 2018-04-10 DIAGNOSIS — R06.00 DYSPNEA, UNSPECIFIED TYPE: ICD-10-CM

## 2018-04-10 DIAGNOSIS — D53.9 MACROCYTIC ANEMIA: ICD-10-CM

## 2018-04-10 PROCEDURE — 99213 OFFICE O/P EST LOW 20 MIN: CPT | Performed by: FAMILY MEDICINE

## 2018-04-10 PROCEDURE — 76642 ULTRASOUND BREAST LIMITED: CPT | Mod: LT

## 2018-04-10 PROCEDURE — 99214 OFFICE O/P EST MOD 30 MIN: CPT | Performed by: FAMILY MEDICINE

## 2018-04-10 PROCEDURE — G0279 TOMOSYNTHESIS, MAMMO: HCPCS

## 2018-04-10 RX ORDER — LORAZEPAM 1 MG/1
1 TABLET ORAL
Qty: 15 TAB | Refills: 0 | Status: SHIPPED | OUTPATIENT
Start: 2018-04-10 | End: 2018-04-25

## 2018-04-10 ASSESSMENT — PAIN SCALES - GENERAL: PAINLEVEL: NO PAIN

## 2018-04-10 NOTE — ASSESSMENT & PLAN NOTE
Patient notes severe worsening of previous dyspnea which actually has been present on and off for at least one year. She reports the past month it is become much more marked. She gets short term benefit using her Ventolin inhaler. Does not feel that Serevent or Spiriva or of any benefit. She did have about a 20-pack-year history of smoking but discontinued that 10 years ago. She does smoke marijuana approximately 3 times per day. Notes minimal gray sputum production in the mornings only, no hemoptysis. She has had some unexplained weight loss approximately 15 pounds in the past 3 months. Two-view chest x-ray obtained in the past 2 months was unremarkable

## 2018-04-10 NOTE — ASSESSMENT & PLAN NOTE
CBC one month ago showed a modest anemia with hemoglobin dropping down to 11.3. Patient always carries a marked macrocytosis despite numerous measurements of normal levels of B-12 and folate. She has not seen any unexplained black or bloody stools. She denies any epistaxis or bloody urine. She is complaining of problems with dyspnea if she even thinks about her eating.

## 2018-04-10 NOTE — PROGRESS NOTES
No chief complaint on file.      HISTORY OF PRESENT ILLNESS: Patient is a 72 y.o. female established patient who presents today to follow-up on worsening dyspnea, macrocytic anemia        Macrocytic anemia  CBC one month ago showed a modest anemia with hemoglobin dropping down to 11.3. Patient always carries a marked macrocytosis despite numerous measurements of normal levels of B-12 and folate. She has not seen any unexplained black or bloody stools. She denies any epistaxis or bloody urine. She is complaining of problems with dyspnea if she even thinks about her eating.    Dyspnea  Patient notes severe worsening of previous dyspnea which actually has been present on and off for at least one year. She reports the past month it is become much more marked. She gets short term benefit using her Ventolin inhaler. Does not feel that Serevent or Spiriva or of any benefit. She did have about a 20-pack-year history of smoking but discontinued that 10 years ago. She does smoke marijuana approximately 3 times per day. Notes minimal gray sputum production in the mornings only, no hemoptysis. She has had some unexplained weight loss approximately 15 pounds in the past 3 months. Two-view chest x-ray obtained in the past 2 months was unremarkable   Social history-is single, not working    Patient Active Problem List    Diagnosis Date Noted   • Breast pain 03/05/2018   • Hot flashes due to menopause 11/02/2017   • Fatigue 04/25/2017   • Peripheral neuropathic pain 04/25/2017   • Major depressive disorder, recurrent episode, in partial remission (CMS-HCC) 12/09/2016   • Dyspnea 12/09/2016   • Abdominal pain, epigastric 09/22/2016   • Cough 10/16/2015   • Paroxysmal atrial fibrillation (CMS-HCC) 09/11/2015   • Prosthetic shoulder infection (CMS-HCC) 09/11/2015   • Macrocytic anemia 09/11/2015   • Diarrhea 08/29/2015   • Hypokalemia 08/29/2015   • Chronic allergic rhinitis 06/02/2015   • Lumbar pain 03/06/2015   • Bilateral hand  pain 01/29/2015   • Marijuana use 04/24/2013   • Osteopenia 04/27/2012   • Helicobacter pylori ab+ treated w/ prevpack in june 2011 09/22/2011   • Hypothyroid 04/15/2011   • Hypoxemia requiring supplemental oxygen 07/22/2010   • Anxiety 06/10/2010   • Insomnia 06/10/2010   • Dyslipidemia 06/10/2010   • GERD (gastroesophageal reflux disease) 06/10/2010   • HTN (hypertension)    • Chronic shoulder pain 08/13/2009   • Right hip pain 08/13/2009   • Multinodular goiter 08/13/2009       Allergies:Urea and Tape    Current Outpatient Prescriptions   Medication Sig Dispense Refill   • simvastatin (ZOCOR) 20 MG Tab TAKE 1 TABLET BY MOUTH EVERY EVENING 30 Tab 11   • ipratropium (ATROVENT) 0.06 % Solution SPRAY 2 SPRAYS IN NOSE 2 TIMES A DAY AS NEEDED. 1 Bottle 2   • XARELTO 20 MG Tab tablet TAKE 1 TABLET BY MOUTH EVERY DAY WITH DINNER 30 Tab 1   • cephALEXin (KEFLEX) 500 MG Cap Take 1 Cap by mouth 3 times a day. 21 Cap 0   • albuterol 108 (90 Base) MCG/ACT Aero Soln inhalation aerosol Inhale 2 Puffs by mouth every 6 hours as needed for Shortness of Breath. 8.5 g 3   • levothyroxine (SYNTHROID) 75 MCG Tab Take 1 Tab by mouth Every morning on an empty stomach. 30 Tab 11   • loperamide (IMODIUM) 2 MG Cap TAKE 1 CAP BY MOUTH 4 TIMES A DAY AS NEEDED. DIARRHEA 20 Cap 6   • dicyclomine (BENTYL) 10 MG Cap TAKE 1 CAP BY MOUTH 2 TIMES A DAY AS NEEDED. 60 Cap 6   • vitamin D, Ergocalciferol, (DRISDOL) 94274 units Cap capsule TAKE 1 CAP BY MOUTH EVERY 7 DAYS. 4 Cap 11   • estradiol (ESTRACE) 1 MG Tab Take 1 Tab by mouth every day. 30 Tab 5   • metoprolol (LOPRESSOR) 25 MG Tab TAKE 1 TABLET BY MOUTH TWICE A DAY 60 Tab 6   • gabapentin (NEURONTIN) 600 MG tablet Take 600 mg by mouth 3 times a day.     • salmeterol (SEREVENT) 50 MCG/DOSE AEROSOL POWDER, BREATH ACTIVATED Inhale 1 Puff by mouth 2 times a day. 1 Each 11   • tiotropium (SPIRIVA) 18 MCG Cap Inhale 1 Cap by mouth every day. 30 Cap 3   • Linaclotide 145 MCG Cap Take  by mouth.     •  "Eszopiclone 3 MG Tab Take 3 mg by mouth at bedtime as needed.     • ranitidine (ZANTAC) 150 MG Tab Take 150 mg by mouth 2 times a day.     • levothyroxine (SYNTHROID) 88 MCG Tab Take 88 mcg by mouth Every morning on an empty stomach.     • diphenhydrAMINE (BENADRYL) 25 MG Tab Take 25 mg by mouth at bedtime as needed for Sleep.     • duloxetine (CYMBALTA) 60 MG Cap DR Particles delayed-release capsule Take 120 mg by mouth every day.     • folic acid (FOLVITE) 1 MG Tab Take 1 Tab by mouth every day. 30 Tab 3   • ramelteon (ROZEREM) 8 MG tablet Take 8 mg by mouth every evening.       No current facility-administered medications for this visit.        Social History   Substance Use Topics   • Smoking status: Former Smoker     Packs/day: 1.00     Years: 25.00     Types: Cigarettes     Quit date: 1/1/2008   • Smokeless tobacco: Never Used   • Alcohol use 0.0 oz/week      Comment: 1 or 2 a mo       Family History   Problem Relation Age of Onset   • Heart Disease Mother    • Cancer Father    • Cancer Maternal Aunt    • Arthritis Maternal Grandmother    • Hypertension Maternal Grandmother    • Diabetes Maternal Grandfather    • Heart Disease Maternal Grandfather    • Cancer Paternal Grandfather        ROS:  Review of Systems   Constitutional: Negative for fever, chills, weight loss and malaise/fatigue.   Eyes: Negative for blurred vision.   Respiratory: Negative for cough, sputum production, shortness of breath and wheezing.    Cardiovascular: Negative for chest pain, palpitations, orthopnea and leg swelling.   Gastrointestinal: Negative for heartburn, nausea, vomiting and abdominal pain.   Musculoskeletal: Negative for myalgias, back pain and joint pain.   Endo/Heme/Allergies: Does not bruise/bleed easily.               Exam:  Blood pressure 140/78, pulse 68, temperature 36.1 °C (97 °F), resp. rate 16, height 1.549 m (5' 0.98\"), weight 75.3 kg (166 lb), SpO2 96 %, not currently breastfeeding.  General:  Well nourished, well " developed female in NAD  Head is grossly normal.  Neck: Supple without JVD or bruit. Thyroid is not enlarged. Trachea is midline.  Pulmonary: Clear to ausculation .  Normal effort. No rales, ronchi, or wheezing.  Cardiovascular: Regular rate and rhythm without murmur.  Abdomen-Abdomen is soft, normal bowel sounds, no masses, guarding, ororganomegaly, or tenderness.  Lower extremities- neg for pretibial edema, redness, tenderness.  Skin-moderate pallor noted without rash    Please note that this dictation was created using voice recognition software. I have made every reasonable attempt to correct obvious errors, but I expect that there are errors of grammar and possibly content that I did not discover before finalizing the note.    Assessment/Plan:  1. Dyspnea, unspecified type     2. Macrocytic anemia        Plan: 1. Obtain CT scan of the chest to look for architectural damage contributing to patient's severe level of dyspnea  2. Update CBC  3. Trial of Dulera 200/5 twice daily  4. Revisit with me in 2 weeks

## 2018-04-18 ENCOUNTER — HOSPITAL ENCOUNTER (OUTPATIENT)
Dept: LAB | Facility: MEDICAL CENTER | Age: 73
End: 2018-04-18
Attending: FAMILY MEDICINE
Payer: MEDICARE

## 2018-04-18 DIAGNOSIS — D53.9 MACROCYTIC ANEMIA: ICD-10-CM

## 2018-04-18 LAB
BASOPHILS # BLD AUTO: 0.7 % (ref 0–1.8)
BASOPHILS # BLD: 0.07 K/UL (ref 0–0.12)
EOSINOPHIL # BLD AUTO: 0.58 K/UL (ref 0–0.51)
EOSINOPHIL NFR BLD: 5.9 % (ref 0–6.9)
ERYTHROCYTE [DISTWIDTH] IN BLOOD BY AUTOMATED COUNT: 51.1 FL (ref 35.9–50)
HCT VFR BLD AUTO: 33.6 % (ref 37–47)
HGB BLD-MCNC: 10.6 G/DL (ref 12–16)
IMM GRANULOCYTES # BLD AUTO: 0.04 K/UL (ref 0–0.11)
IMM GRANULOCYTES NFR BLD AUTO: 0.4 % (ref 0–0.9)
LYMPHOCYTES # BLD AUTO: 2.12 K/UL (ref 1–4.8)
LYMPHOCYTES NFR BLD: 21.4 % (ref 22–41)
MCH RBC QN AUTO: 33.1 PG (ref 27–33)
MCHC RBC AUTO-ENTMCNC: 31.5 G/DL (ref 33.6–35)
MCV RBC AUTO: 105 FL (ref 81.4–97.8)
MONOCYTES # BLD AUTO: 0.81 K/UL (ref 0–0.85)
MONOCYTES NFR BLD AUTO: 8.2 % (ref 0–13.4)
NEUTROPHILS # BLD AUTO: 6.28 K/UL (ref 2–7.15)
NEUTROPHILS NFR BLD: 63.4 % (ref 44–72)
NRBC # BLD AUTO: 0 K/UL
NRBC BLD-RTO: 0 /100 WBC
PLATELET # BLD AUTO: 333 K/UL (ref 164–446)
PMV BLD AUTO: 10.7 FL (ref 9–12.9)
RBC # BLD AUTO: 3.2 M/UL (ref 4.2–5.4)
WBC # BLD AUTO: 9.9 K/UL (ref 4.8–10.8)

## 2018-04-18 PROCEDURE — 36415 COLL VENOUS BLD VENIPUNCTURE: CPT

## 2018-04-18 PROCEDURE — 85025 COMPLETE CBC W/AUTO DIFF WBC: CPT

## 2018-04-20 ENCOUNTER — TELEPHONE (OUTPATIENT)
Dept: MEDICAL GROUP | Facility: MEDICAL CENTER | Age: 73
End: 2018-04-20

## 2018-04-20 NOTE — TELEPHONE ENCOUNTER
----- Message from Jenaro Treviño M.D. sent at 4/19/2018  6:15 PM PDT -----  Mild trending towards more a moderate macrocytic anemia is again noted slightly lower blood counts than last time. Suspect that chronic shoulder disease is causing this persistent lower red blood cell level.

## 2018-04-27 ENCOUNTER — HOSPITAL ENCOUNTER (OUTPATIENT)
Dept: RADIOLOGY | Facility: MEDICAL CENTER | Age: 73
End: 2018-04-27
Attending: FAMILY MEDICINE
Payer: MEDICARE

## 2018-04-27 DIAGNOSIS — R06.00 DYSPNEA, UNSPECIFIED TYPE: ICD-10-CM

## 2018-04-27 PROCEDURE — 700117 HCHG RX CONTRAST REV CODE 255: Performed by: FAMILY MEDICINE

## 2018-04-27 PROCEDURE — 71260 CT THORAX DX C+: CPT

## 2018-04-27 RX ADMIN — IOHEXOL 75 ML: 350 INJECTION, SOLUTION INTRAVENOUS at 15:30

## 2018-04-30 ENCOUNTER — TELEPHONE (OUTPATIENT)
Dept: MEDICAL GROUP | Facility: MEDICAL CENTER | Age: 73
End: 2018-04-30

## 2018-04-30 DIAGNOSIS — R06.00 DYSPNEA, UNSPECIFIED TYPE: ICD-10-CM

## 2018-04-30 RX ORDER — ESTRADIOL 1 MG/1
1 TABLET ORAL DAILY
Qty: 30 TAB | Refills: 0 | Status: SHIPPED | OUTPATIENT
Start: 2018-04-30

## 2018-04-30 RX ORDER — LOPERAMIDE HYDROCHLORIDE 2 MG/1
CAPSULE ORAL
Qty: 20 CAP | Refills: 6 | Status: SHIPPED | OUTPATIENT
Start: 2018-04-30

## 2018-04-30 RX ORDER — RIVAROXABAN 20 MG/1
TABLET, FILM COATED ORAL
Qty: 30 TAB | Refills: 5 | Status: SHIPPED | OUTPATIENT
Start: 2018-04-30 | End: 2018-10-07 | Stop reason: SDUPTHER

## 2018-04-30 NOTE — TELEPHONE ENCOUNTER
CT scan shows just 1 area in the lower right lung that looks like underinflated lung. Unless it was obscuring a mass it does not explain much in the way of her shortness of breath. She also does have her stomach protruding up into her thoracic cavity moderately (hiatal hernia), which could limit breathing mildly. Overall I'm not content that we have explained her increasing shortness of breath and would like to set up a referral for her to be evaluated on a second opinion by a pulmonary lung specialist. Referral sent.

## 2018-05-01 NOTE — TELEPHONE ENCOUNTER
Phone Number Called: [phone] (home)     Message: Pt notified of results below. No questions at this time.     Left Message for patient to call back: N\A

## 2018-05-29 RX ORDER — ESTRADIOL 0.5 MG/1
0.5 TABLET ORAL DAILY
Qty: 30 TAB | Refills: 0 | Status: SHIPPED | OUTPATIENT
Start: 2018-05-29 | End: 2018-07-13 | Stop reason: SDUPTHER

## 2018-05-29 RX ORDER — ESTRADIOL 1 MG/1
TABLET ORAL
Refills: 0 | OUTPATIENT
Start: 2018-05-29

## 2018-05-29 RX ORDER — IPRATROPIUM BROMIDE 42 UG/1
2 SPRAY, METERED NASAL 3 TIMES DAILY
Qty: 1 BOTTLE | Refills: 2 | Status: SHIPPED | OUTPATIENT
Start: 2018-05-29

## 2018-05-29 NOTE — TELEPHONE ENCOUNTER
Was the patient seen in the last year in this department? Yes     Does patient have an active prescription for medications requested? No     Received Request Via: Pharmacy, 90 day supply please

## 2018-05-29 NOTE — TELEPHONE ENCOUNTER
Let patient know that we need to start reducing her dependence and estrogen. I reduced the strength from 1 mg of estradiol daily to 0.5 mg daily. Recommend follow-up visit with me in the next 1 month to discuss further.

## 2018-07-13 RX ORDER — ESTRADIOL 0.5 MG/1
TABLET ORAL
Qty: 30 TAB | Refills: 0 | Status: SHIPPED | OUTPATIENT
Start: 2018-07-13 | End: 2018-09-07 | Stop reason: SDUPTHER

## 2018-07-25 ENCOUNTER — TELEPHONE (OUTPATIENT)
Dept: VASCULAR LAB | Facility: MEDICAL CENTER | Age: 73
End: 2018-07-25

## 2018-07-25 NOTE — TELEPHONE ENCOUNTER
[facility]    Left VM to contact the clinic for any s/s of bleeding or medication changes.  Renal function appears stable.  Pt refuses to be seen in clinic in the past.  Continue with Current Xarelto dose.    David Herring, PharmD

## 2018-08-16 ENCOUNTER — ANTICOAGULATION MONITORING (OUTPATIENT)
Dept: VASCULAR LAB | Facility: MEDICAL CENTER | Age: 73
End: 2018-08-16

## 2018-09-07 NOTE — LETTER
[facility]  9/11/2018    Ora Martinez  [address]  [place] [address]      Dear Ora Martinez:    Based on our records, it appears that it is time for you to call and schedule the following appointment: Follow -up to discuss medication in the next 30 days.     with Dr. Jenaro Treviño    Please contact scheduling @ [phone] , within the next week to schedule this visit.    Thank you,    [facility]

## 2018-09-10 RX ORDER — ESTRADIOL 0.5 MG/1
TABLET ORAL
Qty: 30 TAB | Refills: 0 | Status: SHIPPED | OUTPATIENT
Start: 2018-09-10 | End: 2018-10-09 | Stop reason: SDUPTHER

## 2018-09-27 RX ORDER — AMLODIPINE BESYLATE 5 MG/1
TABLET ORAL
Qty: 30 TAB | Refills: 2 | Status: SHIPPED | OUTPATIENT
Start: 2018-09-27

## 2018-10-08 RX ORDER — RIVAROXABAN 20 MG/1
TABLET, FILM COATED ORAL
Qty: 30 TAB | Refills: 2 | Status: SHIPPED | OUTPATIENT
Start: 2018-10-08 | End: 2018-12-30 | Stop reason: SDUPTHER

## 2019-01-03 RX ORDER — ERGOCALCIFEROL 1.25 MG/1
CAPSULE ORAL
Qty: 4 CAP | Refills: 11 | Status: SHIPPED | OUTPATIENT
Start: 2019-01-03 | End: 2019-12-12

## 2019-05-14 ENCOUNTER — ANTICOAGULATION MONITORING (OUTPATIENT)
Dept: VASCULAR LAB | Facility: MEDICAL CENTER | Age: 74
End: 2019-05-14

## 2019-05-14 NOTE — PROGRESS NOTES
Spoke with pt over phone-- due for BMP while on DOAC.  Pt states she went to [facility] recently.  Will ask MA to look up labs.    Blanka DUNNE  [facility]

## 2019-06-17 ENCOUNTER — ANTICOAGULATION MONITORING (OUTPATIENT)
Dept: VASCULAR LAB | Facility: MEDICAL CENTER | Age: 74
End: 2019-06-17

## 2019-06-17 ENCOUNTER — TELEPHONE (OUTPATIENT)
Dept: VASCULAR LAB | Facility: MEDICAL CENTER | Age: 74
End: 2019-06-17

## 2019-06-17 NOTE — TELEPHONE ENCOUNTER
CrCl 74.8 ml/min  Will continue current dosage and recheck level in 6 months.  MA to inform pt.    Blanka DUNNE  [facility]

## 2019-06-17 NOTE — TELEPHONE ENCOUNTER
Spoke with patient to let her know that Blanka DUNNE has reviewed her lab work, kidney function is normal. Patient to stay on same dose of Xarelto and recheck lab work in 6 months, call us if she has any bleeding problems. Patient states understanding.

## 2019-12-19 NOTE — TELEPHONE ENCOUNTER
This will be patient's last 1 month refill.  She needs to get in and see a provider to continue her medications.

## 2019-12-30 RX ORDER — DICYCLOMINE HYDROCHLORIDE 10 MG/1
CAPSULE ORAL
Qty: 60 CAP | Refills: 1 | Status: SHIPPED | OUTPATIENT
Start: 2019-12-30

## 2021-01-15 DIAGNOSIS — Z23 NEED FOR VACCINATION: ICD-10-CM
